# Patient Record
Sex: MALE | Race: WHITE | NOT HISPANIC OR LATINO | Employment: OTHER | URBAN - METROPOLITAN AREA
[De-identification: names, ages, dates, MRNs, and addresses within clinical notes are randomized per-mention and may not be internally consistent; named-entity substitution may affect disease eponyms.]

---

## 2017-03-20 ENCOUNTER — OFFICE VISIT - HEALTHEAST (OUTPATIENT)
Dept: FAMILY MEDICINE | Facility: CLINIC | Age: 67
End: 2017-03-20

## 2017-03-20 DIAGNOSIS — E78.00 PURE HYPERCHOLESTEROLEMIA: ICD-10-CM

## 2017-03-20 DIAGNOSIS — E55.9 VITAMIN D DEFICIENCY: ICD-10-CM

## 2017-03-20 DIAGNOSIS — Z00.00 HEALTHCARE MAINTENANCE: ICD-10-CM

## 2017-03-20 DIAGNOSIS — I10 ESSENTIAL HYPERTENSION: ICD-10-CM

## 2017-03-20 DIAGNOSIS — N40.0 BPH (BENIGN PROSTATIC HYPERPLASIA): ICD-10-CM

## 2017-03-20 LAB
CHOLEST SERPL-MCNC: 139 MG/DL
FASTING STATUS PATIENT QL REPORTED: YES
HDLC SERPL-MCNC: 42 MG/DL
LDLC SERPL CALC-MCNC: 76 MG/DL
TRIGL SERPL-MCNC: 107 MG/DL

## 2017-03-24 ENCOUNTER — COMMUNICATION - HEALTHEAST (OUTPATIENT)
Dept: FAMILY MEDICINE | Facility: CLINIC | Age: 67
End: 2017-03-24

## 2017-05-02 ENCOUNTER — OFFICE VISIT - HEALTHEAST (OUTPATIENT)
Dept: FAMILY MEDICINE | Facility: CLINIC | Age: 67
End: 2017-05-02

## 2017-05-02 DIAGNOSIS — J30.9 ALLERGIC SINUSITIS: ICD-10-CM

## 2018-03-07 ENCOUNTER — OFFICE VISIT - HEALTHEAST (OUTPATIENT)
Dept: FAMILY MEDICINE | Facility: CLINIC | Age: 68
End: 2018-03-07

## 2018-03-07 DIAGNOSIS — E55.9 VITAMIN D DEFICIENCY: ICD-10-CM

## 2018-03-07 DIAGNOSIS — L81.9 ATYPICAL PIGMENTED SKIN LESION: ICD-10-CM

## 2018-03-07 DIAGNOSIS — E78.00 PURE HYPERCHOLESTEROLEMIA: ICD-10-CM

## 2018-03-07 DIAGNOSIS — I10 ESSENTIAL HYPERTENSION: ICD-10-CM

## 2018-03-07 DIAGNOSIS — N40.0 BPH (BENIGN PROSTATIC HYPERPLASIA): ICD-10-CM

## 2018-03-07 LAB
ALBUMIN SERPL-MCNC: 4 G/DL (ref 3.5–5)
ALP SERPL-CCNC: 83 U/L (ref 45–120)
ALT SERPL W P-5'-P-CCNC: 24 U/L (ref 0–45)
ANION GAP SERPL CALCULATED.3IONS-SCNC: 8 MMOL/L (ref 5–18)
AST SERPL W P-5'-P-CCNC: 22 U/L (ref 0–40)
BILIRUB SERPL-MCNC: 1.1 MG/DL (ref 0–1)
BUN SERPL-MCNC: 12 MG/DL (ref 8–22)
CALCIUM SERPL-MCNC: 9.7 MG/DL (ref 8.5–10.5)
CHLORIDE BLD-SCNC: 104 MMOL/L (ref 98–107)
CHOLEST SERPL-MCNC: 152 MG/DL
CO2 SERPL-SCNC: 26 MMOL/L (ref 22–31)
CREAT SERPL-MCNC: 1.07 MG/DL (ref 0.7–1.3)
FASTING STATUS PATIENT QL REPORTED: YES
GFR SERPL CREATININE-BSD FRML MDRD: >60 ML/MIN/1.73M2
GLUCOSE BLD-MCNC: 95 MG/DL (ref 70–125)
HDLC SERPL-MCNC: 42 MG/DL
LDLC SERPL CALC-MCNC: 87 MG/DL
POTASSIUM BLD-SCNC: 5 MMOL/L (ref 3.5–5)
PROT SERPL-MCNC: 7.2 G/DL (ref 6–8)
SODIUM SERPL-SCNC: 138 MMOL/L (ref 136–145)
TRIGL SERPL-MCNC: 113 MG/DL

## 2018-03-07 ASSESSMENT — MIFFLIN-ST. JEOR: SCORE: 1654.84

## 2018-03-08 ENCOUNTER — COMMUNICATION - HEALTHEAST (OUTPATIENT)
Dept: FAMILY MEDICINE | Facility: CLINIC | Age: 68
End: 2018-03-08

## 2018-03-08 LAB — 25(OH)D3 SERPL-MCNC: 75.8 NG/ML (ref 30–80)

## 2018-03-09 LAB
LAB AP CHARGES (HE HISTORICAL CONVERSION): NORMAL
PATH REPORT.COMMENTS IMP SPEC: NORMAL
PATH REPORT.FINAL DX SPEC: NORMAL
PATH REPORT.GROSS SPEC: NORMAL
PATH REPORT.MICROSCOPIC SPEC OTHER STN: NORMAL
PATH REPORT.RELEVANT HX SPEC: NORMAL
RESULT FLAG (HE HISTORICAL CONVERSION): NORMAL

## 2018-03-10 ENCOUNTER — COMMUNICATION - HEALTHEAST (OUTPATIENT)
Dept: FAMILY MEDICINE | Facility: CLINIC | Age: 68
End: 2018-03-10

## 2018-03-10 DIAGNOSIS — N40.0 BPH (BENIGN PROSTATIC HYPERPLASIA): ICD-10-CM

## 2018-03-10 DIAGNOSIS — E78.00 PURE HYPERCHOLESTEROLEMIA: ICD-10-CM

## 2018-03-14 ENCOUNTER — OFFICE VISIT - HEALTHEAST (OUTPATIENT)
Dept: FAMILY MEDICINE | Facility: CLINIC | Age: 68
End: 2018-03-14

## 2018-03-14 DIAGNOSIS — I10 ESSENTIAL HYPERTENSION: ICD-10-CM

## 2018-03-14 DIAGNOSIS — Z48.02 VISIT FOR SUTURE REMOVAL: ICD-10-CM

## 2018-03-14 ASSESSMENT — MIFFLIN-ST. JEOR: SCORE: 1654.84

## 2018-07-25 ENCOUNTER — RECORDS - HEALTHEAST (OUTPATIENT)
Dept: ADMINISTRATIVE | Facility: OTHER | Age: 68
End: 2018-07-25

## 2018-07-30 ENCOUNTER — OFFICE VISIT - HEALTHEAST (OUTPATIENT)
Dept: FAMILY MEDICINE | Facility: CLINIC | Age: 68
End: 2018-07-30

## 2018-07-30 ENCOUNTER — COMMUNICATION - HEALTHEAST (OUTPATIENT)
Dept: SCHEDULING | Facility: CLINIC | Age: 68
End: 2018-07-30

## 2018-07-30 DIAGNOSIS — S30.860A TICK BITE OF BACK, INITIAL ENCOUNTER: ICD-10-CM
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
## 2018-12-20 ENCOUNTER — COMMUNICATION - HEALTHEAST (OUTPATIENT)
Dept: FAMILY MEDICINE | Facility: CLINIC | Age: 68
End: 2018-12-20

## 2018-12-20 DIAGNOSIS — E78.00 PURE HYPERCHOLESTEROLEMIA: ICD-10-CM

## 2019-03-21 ENCOUNTER — COMMUNICATION - HEALTHEAST (OUTPATIENT)
Dept: FAMILY MEDICINE | Facility: CLINIC | Age: 69
End: 2019-03-21

## 2019-03-21 DIAGNOSIS — I10 ESSENTIAL HYPERTENSION: ICD-10-CM

## 2019-04-01 ENCOUNTER — COMMUNICATION - HEALTHEAST (OUTPATIENT)
Dept: FAMILY MEDICINE | Facility: CLINIC | Age: 69
End: 2019-04-01

## 2019-04-01 DIAGNOSIS — N40.0 BPH (BENIGN PROSTATIC HYPERPLASIA): ICD-10-CM

## 2019-06-10 ENCOUNTER — COMMUNICATION - HEALTHEAST (OUTPATIENT)
Dept: FAMILY MEDICINE | Facility: CLINIC | Age: 69
End: 2019-06-10

## 2019-06-10 ENCOUNTER — OFFICE VISIT - HEALTHEAST (OUTPATIENT)
Dept: FAMILY MEDICINE | Facility: CLINIC | Age: 69
End: 2019-06-10

## 2019-06-10 DIAGNOSIS — Z11.59 ENCOUNTER FOR HEPATITIS C SCREENING TEST FOR LOW RISK PATIENT: ICD-10-CM

## 2019-06-10 DIAGNOSIS — I10 ESSENTIAL HYPERTENSION: ICD-10-CM

## 2019-06-10 DIAGNOSIS — E78.00 PURE HYPERCHOLESTEROLEMIA: ICD-10-CM

## 2019-06-10 DIAGNOSIS — Z00.00 HEALTH CARE MAINTENANCE: ICD-10-CM

## 2019-06-10 LAB
ALBUMIN SERPL-MCNC: 4.1 G/DL (ref 3.5–5)
ALP SERPL-CCNC: 82 U/L (ref 45–120)
ALT SERPL W P-5'-P-CCNC: 24 U/L (ref 0–45)
ANION GAP SERPL CALCULATED.3IONS-SCNC: 10 MMOL/L (ref 5–18)
AST SERPL W P-5'-P-CCNC: 24 U/L (ref 0–40)
BILIRUB SERPL-MCNC: 0.9 MG/DL (ref 0–1)
BUN SERPL-MCNC: 13 MG/DL (ref 8–22)
CALCIUM SERPL-MCNC: 9.7 MG/DL (ref 8.5–10.5)
CHLORIDE BLD-SCNC: 105 MMOL/L (ref 98–107)
CHOLEST SERPL-MCNC: 136 MG/DL
CO2 SERPL-SCNC: 25 MMOL/L (ref 22–31)
CREAT SERPL-MCNC: 0.96 MG/DL (ref 0.7–1.3)
FASTING STATUS PATIENT QL REPORTED: YES
GFR SERPL CREATININE-BSD FRML MDRD: >60 ML/MIN/1.73M2
GLUCOSE BLD-MCNC: 90 MG/DL (ref 70–125)
HCV AB SERPL QL IA: NEGATIVE
HDLC SERPL-MCNC: 47 MG/DL
LDLC SERPL CALC-MCNC: 72 MG/DL
POTASSIUM BLD-SCNC: 4.9 MMOL/L (ref 3.5–5)
PROT SERPL-MCNC: 7 G/DL (ref 6–8)
SODIUM SERPL-SCNC: 140 MMOL/L (ref 136–145)
TRIGL SERPL-MCNC: 86 MG/DL

## 2019-06-10 ASSESSMENT — MIFFLIN-ST. JEOR: SCORE: 1620.82

## 2019-06-21 ENCOUNTER — COMMUNICATION - HEALTHEAST (OUTPATIENT)
Dept: FAMILY MEDICINE | Facility: CLINIC | Age: 69
End: 2019-06-21

## 2019-06-21 DIAGNOSIS — E78.00 PURE HYPERCHOLESTEROLEMIA: ICD-10-CM

## 2019-07-02 ENCOUNTER — COMMUNICATION - HEALTHEAST (OUTPATIENT)
Dept: FAMILY MEDICINE | Facility: CLINIC | Age: 69
End: 2019-07-02

## 2019-07-02 DIAGNOSIS — I10 ESSENTIAL HYPERTENSION: ICD-10-CM

## 2019-07-08 ENCOUNTER — OFFICE VISIT - HEALTHEAST (OUTPATIENT)
Dept: FAMILY MEDICINE | Facility: CLINIC | Age: 69
End: 2019-07-08

## 2019-07-08 DIAGNOSIS — I10 ESSENTIAL HYPERTENSION: ICD-10-CM

## 2019-07-08 DIAGNOSIS — R00.0 TACHYCARDIA: ICD-10-CM

## 2019-07-08 DIAGNOSIS — R94.31 ABNORMAL ELECTROCARDIOGRAM: ICD-10-CM

## 2019-07-08 LAB
ATRIAL RATE - MUSE: 113 BPM
DIASTOLIC BLOOD PRESSURE - MUSE: NORMAL MMHG
INTERPRETATION ECG - MUSE: NORMAL
P AXIS - MUSE: 66 DEGREES
PR INTERVAL - MUSE: 152 MS
QRS DURATION - MUSE: 70 MS
QT - MUSE: 318 MS
QTC - MUSE: 436 MS
R AXIS - MUSE: 71 DEGREES
SYSTOLIC BLOOD PRESSURE - MUSE: NORMAL MMHG
T AXIS - MUSE: 47 DEGREES
VENTRICULAR RATE- MUSE: 113 BPM

## 2019-07-08 ASSESSMENT — MIFFLIN-ST. JEOR: SCORE: 1589.07

## 2019-07-30 ENCOUNTER — HOSPITAL ENCOUNTER (OUTPATIENT)
Dept: NUCLEAR MEDICINE | Facility: HOSPITAL | Age: 69
Discharge: HOME | End: 2019-07-30
Attending: FAMILY MEDICINE

## 2019-07-30 ENCOUNTER — COMMUNICATION - HEALTHEAST (OUTPATIENT)
Dept: FAMILY MEDICINE | Facility: CLINIC | Age: 69
End: 2019-07-30

## 2019-07-30 ENCOUNTER — HOSPITAL ENCOUNTER (OUTPATIENT)
Dept: CARDIOLOGY | Facility: HOSPITAL | Age: 69
Discharge: HOME | End: 2019-07-30
Attending: FAMILY MEDICINE

## 2019-07-30 DIAGNOSIS — R94.31 ABNORMAL ELECTROCARDIOGRAM: ICD-10-CM

## 2019-07-30 DIAGNOSIS — R00.0 TACHYCARDIA: ICD-10-CM

## 2019-07-30 DIAGNOSIS — Z82.49 FAMILY HISTORY OF ARRHYTHMIA: ICD-10-CM

## 2019-07-30 LAB
CV STRESS CURRENT BP HE: NORMAL
CV STRESS CURRENT HR HE: 107
CV STRESS CURRENT HR HE: 108
CV STRESS CURRENT HR HE: 110
CV STRESS CURRENT HR HE: 111
CV STRESS CURRENT HR HE: 112
CV STRESS CURRENT HR HE: 113
CV STRESS CURRENT HR HE: 114
CV STRESS CURRENT HR HE: 115
CV STRESS CURRENT HR HE: 121
CV STRESS CURRENT HR HE: 130
CV STRESS CURRENT HR HE: 132
CV STRESS CURRENT HR HE: 133
CV STRESS CURRENT HR HE: 134
CV STRESS CURRENT HR HE: 135
CV STRESS CURRENT HR HE: 143
CV STRESS CURRENT HR HE: 144
CV STRESS CURRENT HR HE: 153
CV STRESS CURRENT HR HE: 162
CV STRESS CURRENT HR HE: 163
CV STRESS CURRENT HR HE: 166
CV STRESS CURRENT HR HE: 167
CV STRESS CURRENT HR HE: 168
CV STRESS CURRENT HR HE: 89
CV STRESS DEVIATION TIME HE: NORMAL
CV STRESS ECHO PERCENT HR HE: NORMAL
CV STRESS EXERCISE STAGE HE: NORMAL
CV STRESS EXERCISE STAGE REACHED HE: NORMAL
CV STRESS FINAL RESTING BP HE: NORMAL
CV STRESS FINAL RESTING HR HE: 111
CV STRESS MAX HR HE: 168
CV STRESS MAX TREADMILL GRADE HE: 14
CV STRESS MAX TREADMILL SPEED HE: 3.4
CV STRESS PEAK DIA BP HE: NORMAL
CV STRESS PEAK SYS BP HE: NORMAL
CV STRESS PHASE HE: NORMAL
CV STRESS PROTOCOL HE: NORMAL
CV STRESS RESTING PT POSITION HE: NORMAL
CV STRESS RESTING PT POSITION HE: NORMAL
CV STRESS ST DEVIATION AMOUNT HE: NORMAL
CV STRESS ST DEVIATION ELEVATION HE: NORMAL
CV STRESS ST EVELATION AMOUNT HE: NORMAL
CV STRESS TEST TYPE HE: NORMAL
CV STRESS TOTAL STAGE TIME MIN 1 HE: NORMAL
NUC STRESS EJECTION FRACTION: 65 %
STRESS ECHO BASELINE BP: NORMAL
STRESS ECHO BASELINE HR: 87
STRESS ECHO CALCULATED PERCENT HR: 111 %
STRESS ECHO LAST STRESS BP: NORMAL
STRESS ECHO LAST STRESS HR: 167
STRESS ECHO POST ESTIMATED WORKLOAD: 7.9
STRESS ECHO POST EXERCISE DUR MIN: 6
STRESS ECHO POST EXERCISE DUR SEC: 30
STRESS ECHO TARGET HR: 128

## 2019-07-30 ASSESSMENT — MIFFLIN-ST. JEOR: SCORE: 1589.07

## 2019-08-02 ENCOUNTER — HOSPITAL ENCOUNTER (OUTPATIENT)
Dept: CARDIOLOGY | Facility: HOSPITAL | Age: 69
Discharge: HOME | End: 2019-08-02
Attending: FAMILY MEDICINE

## 2019-08-02 DIAGNOSIS — R00.0 TACHYCARDIA: ICD-10-CM

## 2019-08-02 DIAGNOSIS — Z82.49 FAMILY HISTORY OF ARRHYTHMIA: ICD-10-CM

## 2020-04-02 ENCOUNTER — COMMUNICATION - HEALTHEAST (OUTPATIENT)
Dept: FAMILY MEDICINE | Facility: CLINIC | Age: 70
End: 2020-04-02

## 2020-04-02 DIAGNOSIS — N40.0 BPH (BENIGN PROSTATIC HYPERPLASIA): ICD-10-CM

## 2020-06-24 ENCOUNTER — COMMUNICATION - HEALTHEAST (OUTPATIENT)
Dept: FAMILY MEDICINE | Facility: CLINIC | Age: 70
End: 2020-06-24

## 2020-06-24 DIAGNOSIS — I10 ESSENTIAL HYPERTENSION: ICD-10-CM

## 2020-06-25 ENCOUNTER — COMMUNICATION - HEALTHEAST (OUTPATIENT)
Dept: FAMILY MEDICINE | Facility: CLINIC | Age: 70
End: 2020-06-25

## 2020-06-25 DIAGNOSIS — I10 ESSENTIAL HYPERTENSION: ICD-10-CM

## 2020-07-03 ENCOUNTER — COMMUNICATION - HEALTHEAST (OUTPATIENT)
Dept: LAB | Facility: CLINIC | Age: 70
End: 2020-07-03

## 2020-07-06 ENCOUNTER — AMBULATORY - HEALTHEAST (OUTPATIENT)
Dept: FAMILY MEDICINE | Facility: CLINIC | Age: 70
End: 2020-07-06

## 2020-07-06 DIAGNOSIS — I10 ESSENTIAL HYPERTENSION: ICD-10-CM

## 2020-07-06 DIAGNOSIS — Z00.00 HEALTHCARE MAINTENANCE: ICD-10-CM

## 2020-07-08 ENCOUNTER — AMBULATORY - HEALTHEAST (OUTPATIENT)
Dept: LAB | Facility: CLINIC | Age: 70
End: 2020-07-08

## 2020-07-08 DIAGNOSIS — Z00.00 HEALTHCARE MAINTENANCE: ICD-10-CM

## 2020-07-08 DIAGNOSIS — I10 ESSENTIAL HYPERTENSION: ICD-10-CM

## 2020-07-08 LAB
ALBUMIN SERPL-MCNC: 4.1 G/DL (ref 3.5–5)
ALP SERPL-CCNC: 84 U/L (ref 45–120)
ALT SERPL W P-5'-P-CCNC: 28 U/L (ref 0–45)
ANION GAP SERPL CALCULATED.3IONS-SCNC: 11 MMOL/L (ref 5–18)
AST SERPL W P-5'-P-CCNC: 21 U/L (ref 0–40)
BILIRUB SERPL-MCNC: 1 MG/DL (ref 0–1)
BUN SERPL-MCNC: 20 MG/DL (ref 8–28)
CALCIUM SERPL-MCNC: 9.8 MG/DL (ref 8.5–10.5)
CHLORIDE BLD-SCNC: 100 MMOL/L (ref 98–107)
CHOLEST SERPL-MCNC: 147 MG/DL
CO2 SERPL-SCNC: 27 MMOL/L (ref 22–31)
CREAT SERPL-MCNC: 1.05 MG/DL (ref 0.7–1.3)
FASTING STATUS PATIENT QL REPORTED: YES
GFR SERPL CREATININE-BSD FRML MDRD: >60 ML/MIN/1.73M2
GLUCOSE BLD-MCNC: 102 MG/DL (ref 70–125)
HDLC SERPL-MCNC: 44 MG/DL
LDLC SERPL CALC-MCNC: 79 MG/DL
POTASSIUM BLD-SCNC: 3.4 MMOL/L (ref 3.5–5)
PROT SERPL-MCNC: 7.3 G/DL (ref 6–8)
SODIUM SERPL-SCNC: 138 MMOL/L (ref 136–145)
TRIGL SERPL-MCNC: 122 MG/DL

## 2020-07-09 ENCOUNTER — COMMUNICATION - HEALTHEAST (OUTPATIENT)
Dept: FAMILY MEDICINE | Facility: CLINIC | Age: 70
End: 2020-07-09

## 2020-07-21 ENCOUNTER — COMMUNICATION - HEALTHEAST (OUTPATIENT)
Dept: FAMILY MEDICINE | Facility: CLINIC | Age: 70
End: 2020-07-21

## 2020-07-21 ENCOUNTER — OFFICE VISIT - HEALTHEAST (OUTPATIENT)
Dept: FAMILY MEDICINE | Facility: CLINIC | Age: 70
End: 2020-07-21

## 2020-07-21 DIAGNOSIS — Z00.00 HEALTH CARE MAINTENANCE: ICD-10-CM

## 2020-07-21 DIAGNOSIS — E78.00 PURE HYPERCHOLESTEROLEMIA: ICD-10-CM

## 2020-07-21 DIAGNOSIS — M19.031 LOCALIZED PRIMARY OSTEOARTHROSIS OF CARPOMETACARPAL JOINT OF RIGHT WRIST: ICD-10-CM

## 2020-07-21 DIAGNOSIS — N40.0 BPH (BENIGN PROSTATIC HYPERPLASIA): ICD-10-CM

## 2020-07-21 DIAGNOSIS — I10 ESSENTIAL HYPERTENSION: ICD-10-CM

## 2020-07-21 RX ORDER — ATORVASTATIN CALCIUM 40 MG/1
40 TABLET, FILM COATED ORAL DAILY
Qty: 90 TABLET | Refills: 3 | Status: SHIPPED | OUTPATIENT
Start: 2020-07-21 | End: 2021-07-22

## 2020-07-21 RX ORDER — TERAZOSIN 5 MG/1
5 CAPSULE ORAL AT BEDTIME
Qty: 90 CAPSULE | Refills: 3 | Status: SHIPPED | OUTPATIENT
Start: 2020-07-21 | End: 2021-10-28

## 2020-07-21 RX ORDER — CHLORTHALIDONE 25 MG/1
25 TABLET ORAL DAILY
Qty: 90 TABLET | Refills: 3 | Status: SHIPPED | OUTPATIENT
Start: 2020-07-21 | End: 2021-07-22

## 2020-07-21 ASSESSMENT — MIFFLIN-ST. JEOR: SCORE: 1612.88

## 2021-05-26 NOTE — TELEPHONE ENCOUNTER
Refill Approved    Rx renewed per Medication Renewal Policy. Medication was last renewed on 3/7/18.    Nidhi Ruggiero, [facility] Triage/Med Refill 3/23/2019     Requested Prescriptions   Pending Prescriptions Disp Refills     verapamil (VERELAN PM) 240 MG 24 hr capsule [Pharmacy Med Name: VERAPAMIL  MG CAPSULE] 90 capsule 3     Sig: TAKE 1 CAP BY MOUTH AT BEDTIME    Calcium-Channel Blockers Protocol Passed - 3/21/2019  4:19 PM       Passed - PCP or prescribing provider visit in past 12 months or next 3 months    Last office visit with prescriber/PCP: 3/14/2018 Chiquis Lopez MD OR same dept: 7/30/2018 Kathy Campuzano MD OR same specialty: 7/30/2018 Kathy Campuzano MD  Last physical: Visit date not found Last MTM visit: Visit date not found   Next visit within 3 mo: Visit date not found  Next physical within 3 mo: Visit date not found  Prescriber OR PCP: Chiquis Lopez MD  Last diagnosis associated with med order: 1. Essential hypertension  - verapamil (VERELAN PM) 240 MG 24 hr capsule [Pharmacy Med Name: VERAPAMIL  MG CAPSULE]; TAKE 1 CAP BY MOUTH AT BEDTIME  Dispense: 90 capsule; Refill: 3    If protocol passes may refill for 12 months if within 3 months of last provider visit (or a total of 15 months).            Passed - Blood pressure filed in past 12 months    BP Readings from Last 1 Encounters:   07/30/18 120/80

## 2021-05-27 NOTE — TELEPHONE ENCOUNTER
Former patient of Krystle & has not established care with another provider.  Please assign refill request to covering provider per Clinic standard process.      Refill Approved    Rx renewed per Medication Renewal Policy. Medication was last renewed on 2/22/16.    Heena Pang, [facility] Triage/Med Refill 4/2/2019     Requested Prescriptions   Pending Prescriptions Disp Refills     terazosin (HYTRIN) 5 MG capsule [Pharmacy Med Name: TERAZOSIN 5 MG CAPSULE] 90 capsule 3     Sig: TAKE 1 CAPSULE (5 MG TOTAL) BY MOUTH AT BEDTIME.    Alpha Blockers Refill Protocol  Passed - 4/1/2019  1:31 PM       Passed - PCP or prescribing provider visit in past 12 months      Last office visit with prescriber/PCP: 3/14/2018 Chiquis Lopez MD OR same dept: 7/30/2018 Kathy Campuzano MD OR same specialty: 7/30/2018 Kathy Campuzano MD  Last physical: Visit date not found Last MTM visit: Visit date not found   Next visit within 3 mo: Visit date not found  Next physical within 3 mo: Visit date not found  Prescriber OR PCP: Chiquis Lopez MD  Last diagnosis associated with med order: 1. BPH (benign prostatic hyperplasia)  - terazosin (HYTRIN) 5 MG capsule [Pharmacy Med Name: TERAZOSIN 5 MG CAPSULE]; Take 1 capsule (5 mg total) by mouth at bedtime.  Dispense: 90 capsule; Refill: 3    If protocol passes may refill for 12 months if within 3 months of last provider visit (or a total of 15 months).            Passed - Blood pressure filed in past 12 months    BP Readings from Last 1 Encounters:   07/30/18 120/80

## 2021-05-29 ENCOUNTER — RECORDS - HEALTHEAST (OUTPATIENT)
Dept: ADMINISTRATIVE | Facility: CLINIC | Age: 71
End: 2021-05-29

## 2021-05-29 NOTE — PROGRESS NOTES
Assessment and Plan:     Presents to clinic for annual wellness visit.  His blood pressure was slightly elevated and his verapamil is significantly more expensive than it has been in the past.  We chose to switch to chlorthalidone for its cardioprotective benefits and likely cheaper price point.  Patient will monitor blood pressure for the next 2 weeks and return for evaluation of efficacy.    Handout was provided detailing some shoulder exercises patient could do to limit some of the stiffness he feels after exercising.      USPSTF Recommendations for age 69;  Patient has been counseled on/screened for:  - intimate partner violence and there are no concerns at this time  - a healthful diet and physical activity for CVD disease prevention  - Diabetes and hyperlipidemia: screening was performed.   - Hep C, ordered today  - Asprin use: patient chose to continue for this year  Sexually transmitted infections: Patient would not like to be screened for chlamydia, gonorrhea, syphilis, HIV, and hepatitis  Colorectal Cancer: Colonoscopy last performed 2018  Immunizations: up to date except for shingles which was recommended  Fall risk assessment: get up and go test completed without concerns      The patient's current medical problems were reviewed.    I have had an Advance Directives discussion with the patient.     The following health maintenance schedule was reviewed with the patient and provided in printed form in the after visit summary:   Health Maintenance   Topic Date Due     ZOSTER VACCINES (2 of 3) 10/08/2012     INFLUENZA VACCINE RULE BASED (Season Ended) 08/01/2019     FALL RISK ASSESSMENT  06/10/2020     TD 18+ HE  08/16/2021     ADVANCE DIRECTIVES DISCUSSED WITH PATIENT  06/10/2024     COLONOSCOPY  07/25/2028     PNEUMOCOCCAL POLYSACCHARIDE VACCINE AGE 65 AND OVER  Completed     PNEUMOCOCCAL CONJUGATE VACCINE FOR ADULTS (PCV13 OR PREVNAR)  Completed        Subjective:   Chief Complaint: Eduardo Collins is  an 69 y.o. male here for an Annual Wellness visit.     HPI:    - patient monitors blood pressure at home and intermittently outside the home: typically 125/80 is the average; he's a little surprised that it's slightly elevated today  - questions about aspirin  -he wonders if he should have the new shingles shot  - he also has concerns about verapamil. He has been on it for 30 years. He is wondering if there is another option that might also work that might be cheaper.   -Patient has some shoulder stiffness after exercising.    Review of Systems:  Allergy symptoms, weight changes, leg cramps, joint pain, arthritis, back pain, joint swelling, and sexual dysfunction.  Please see above.  The rest of the review of systems are negative for all systems.    Patient Care Team:  Chiquis Lopez MD as PCP - General (Family Medicine)     Patient Active Problem List   Diagnosis     Hypercholesterolemia     Hypertension     Localized primary osteoarthrosis of carpometacarpal joint of right wrist     Past Medical History:   Diagnosis Date     Arthritis      Depression     Controlled per patient, on medication     Hayfever     Seasonal per patient     Hyperlipemia     per patient      Hypertension      Prostatitis     History of in past per patient      Past Surgical History:   Procedure Laterality Date     APPENDECTOMY  1995     TOE SURGERY Bilateral     Had bilateral great toe surgery in past      Family History   Problem Relation Age of Onset     Cancer Mother         skin and multiple myeloma     Stroke Father      Heart disease Brother      Hypertension Brother       Social History     Socioeconomic History     Marital status:      Spouse name: Not on file     Number of children: Not on file     Years of education: Not on file     Highest education level: Not on file   Occupational History     Occupation:      Employer: [employer]   Social Needs     Financial resource strain: Not on file     Food  "insecurity:     Worry: Not on file     Inability: Not on file     Transportation needs:     Medical: Not on file     Non-medical: Not on file   Tobacco Use     Smoking status: Never Smoker     Smokeless tobacco: Never Used   Substance and Sexual Activity     Alcohol use: Yes     Alcohol/week: 3.6 oz     Types: 6 Cans of beer per week     Comment: Usually has a couple of beers 2-3 x per week.     Drug use: No     Sexual activity: Yes     Partners: Female   Lifestyle     Physical activity:     Days per week: Not on file     Minutes per session: Not on file     Stress: Not on file   Relationships     Social connections:     Talks on phone: Not on file     Gets together: Not on file     Attends Hindu service: Not on file     Active member of club or organization: Not on file     Attends meetings of clubs or organizations: Not on file     Relationship status: Not on file     Intimate partner violence:     Fear of current or ex partner: Not on file     Emotionally abused: Not on file     Physically abused: Not on file     Forced sexual activity: Not on file   Other Topics Concern     Not on file   Social History Narrative     Not on file      Current Outpatient Medications   Medication Sig Dispense Refill     aspirin 81 MG tablet Take 81 mg by mouth bedtime.        atorvastatin (LIPITOR) 10 MG tablet Take 1 tablet (10 mg total) by mouth at bedtime. 90 tablet 1     cholecalciferol, vitamin D3, 1,000 unit tablet Take 5,000 Units by mouth bedtime.        terazosin (HYTRIN) 5 MG capsule TAKE 1 CAPSULE (5 MG TOTAL) BY MOUTH AT BEDTIME. 90 capsule 3     chlorthalidone (HYGROTEN) 25 MG tablet Take 1 tablet (25 mg total) by mouth daily. 30 tablet 0     No current facility-administered medications for this visit.       Objective:   Vital Signs:   Visit Vitals  BP (!) 134/98   Pulse 84   Ht 5' 9\" (1.753 m)   Wt 193 lb (87.5 kg)   SpO2 96%   BMI 28.50 kg/m         VisionScreening:  No exam data present     PHYSICAL EXAM  Gen: " Alert, NAD, appears stated age, normal hygiene   Eyes: conjunctivae without injection, sclera clear, EOMI  ENT/mouth: nares clear, septum midline, absent rhinorrhea, throat without injection, neck is supple, no thyroid enlargement  CV: RRR, no murmur appreciated, pedal edema absent bilaterally  Resp: CTAB, no wheezes, rales or ronchi  ABD: normoactive, non-tender to palpation, nondistended  MSK: grossly full range of motion in all joints, no obvious deformity although there is a slight decrease in flexion of the right index finger  Neuro: CN II-XII grossly intact, no deficits in coordination  Psych: no apparent hallucinations or delusions, no pressured speech; alert, oriented x3  SKIN: dry and without lesions except a known and unchanging birthmark appreciated on patient's right upper extremity  Heme/lymph: no pallor, no active bleeding/bruising, bilateral submandibular adenopathy appreciated      Assessment Results 6/10/2019   Activities of Daily Living No help needed   Instrumental Activities of Daily Living No help needed   Get Up and Go Score Less than 12 seconds   Mini Cog Total Score 5   Some recent data might be hidden     A Mini-Cog score of 0-2 suggests the possibility of dementia, score of 3-5 suggests no dementia    Identified Health Risks:     The patient was counseled and encouraged to consider modifying their diet and eating habits. He was provided with information on recommended healthy diet options.  The patient was provided with written information regarding signs of hearing loss.  He is at risk for falling and has been provided with information to reduce the risk of falling at home.  Patient's advanced directive was discussed and I am comfortable with the patient's wishes.

## 2021-05-29 NOTE — TELEPHONE ENCOUNTER
6/10/2019 dosage changed for Atorvastatin from 10mg daily to 40 mg daily. Message sent to pharmacy.    Allison Saldivar RN [facility] Triage Nurse

## 2021-05-30 VITALS — WEIGHT: 194 LBS | BODY MASS INDEX: 27.84 KG/M2

## 2021-05-30 VITALS — WEIGHT: 197.1 LBS | BODY MASS INDEX: 28.28 KG/M2

## 2021-05-30 NOTE — PROGRESS NOTES
Just to clarify, you believe the abnormal component is just artifactual - no further follow up necessary?

## 2021-05-30 NOTE — PROGRESS NOTES
Assessment/Plan:     Patient returns to clinic for hypertension follow-up.  His blood pressure originally had an elevated diastolic value, this resolved with recheck.  I do not feel that increasing the chlorthalidone right now is necessary, and patient is comfortable to current dosing.    However, patient's pulse was noted to be markedly elevated at 122, on repeat evaluation was 115.  Patient denies shortness of breath, dizziness, chest pressure or pain, paresthesias, etc.  Although his heart rhythm was regular, and he endorses no history of arrhythmias, we felt that an EKG was appropriate.  This demonstrated just a sinus tachycardia, although my interpretation included some concern about a baseline without significant ST depression.  We will await cardiology read, and order a stress test for baseline for the patient.  I do not feel that he needs emergent work-up as he is currently asymptomatic.     Problem List Items Addressed This Visit        ENT/CARD/PULM/ENDO Problems    Hypertension (Chronic)      Other Visit Diagnoses     Tachycardia    -  Primary    Relevant Orders    Electrocardiogram Perform and Read (Completed)          Return to clinic in 3 weeks; if stress test is normal, will consider labs for tachycardia.    Total time spent with patient was 25 minutes with greater than 50% spent in face-to-face counseling regarding the above plan.    Subjective:      Eduardo Collins is a 69 y.o. male who presents to clinic for med check.   Patient has a history of hypertension, at his physical he endorsed that verapamil is significantly more expensive than he would like.  We switch to chlorthalidone and patient was to monitor his blood pressure for 2 weeks.    Patient is noticing no side effects from the chlorthalidone, he is noticing no hyper or hypotensive episodes.  He would like all his medications refilled.  He wished to discuss why we had gone from 10 to 40 mg of atorvastatin at the last appointment.  He  "does note that occasionally his pulse is elevated, it is usually 80 at its lowest, when he is resting.  He occasionally notices his pulse is significantly elevated, the highest we have seen in clinic is 109.  Today, his pulse is 122.    He does note that his brother  yesterday, and that that could be part of patient's current elevated diastolic blood pressure and pulse.    Past Medical History, Family History, and Social History reviewed.     Current Outpatient Medications on File Prior to Visit   Medication Sig Dispense Refill     aspirin 81 MG tablet Take 81 mg by mouth bedtime.        atorvastatin (LIPITOR) 40 MG tablet Take 1 tablet (40 mg total) by mouth daily. 90 tablet 3     chlorthalidone (HYGROTEN) 25 MG tablet TAKE 1 TABLET BY MOUTH EVERY DAY 90 tablet 3     cholecalciferol, vitamin D3, 1,000 unit tablet Take 5,000 Units by mouth bedtime.        terazosin (HYTRIN) 5 MG capsule TAKE 1 CAPSULE (5 MG TOTAL) BY MOUTH AT BEDTIME. 90 capsule 3     No current facility-administered medications on file prior to visit.        Review of systems is as stated in HPI.  The remainder of the 10 system review is otherwise negative.    Objective:     /80   Pulse (!) 115   Ht 5' 9\" (1.753 m)   Wt 186 lb (84.4 kg)   SpO2 97%   BMI 27.47 kg/m   Body mass index is 27.47 kg/m .    Gen: Alert, NAD, appears stated age, normal hygiene   CV: tachycardic but regular rhythm, no murmur appreciated, pedal edema absent bilaterally  Psych: no apparent hallucinations or delusions, no pressured speech; alert, oriented x3  SKIN: dry and without lesions      This note has been dictated using voice recognition software. Any grammatical or context distortions are unintentional and inherent to the the software.     Chiquis Lopez MD  "

## 2021-05-30 NOTE — TELEPHONE ENCOUNTER
Refill Approved    Rx renewed per Medication Renewal Policy. Medication was last renewed on 6/10/19.    Heena Pang, [facility] Triage/Med Refill 7/3/2019     Requested Prescriptions   Pending Prescriptions Disp Refills     chlorthalidone (HYGROTEN) 25 MG tablet [Pharmacy Med Name: CHLORTHALIDONE 25 MG TABLET] 30 tablet 0     Sig: TAKE 1 TABLET BY MOUTH EVERY DAY       Diuretics/Combination Diuretics Refill Protocol  Passed - 7/2/2019  1:31 PM        Passed - Visit with PCP or prescribing provider visit in past 12 months     Last office visit with prescriber/PCP: 3/14/2018 Chiquis Lopez MD OR same dept: 7/30/2018 Kathy Campuzano MD OR same specialty: 7/30/2018 Kathy Campuzano MD  Last physical: 6/10/2019 Last MTM visit: Visit date not found   Next visit within 3 mo: Visit date not found  Next physical within 3 mo: Visit date not found  Prescriber OR PCP: Chiquis Lopez MD  Last diagnosis associated with med order: 1. Essential hypertension  - chlorthalidone (HYGROTEN) 25 MG tablet [Pharmacy Med Name: CHLORTHALIDONE 25 MG TABLET]; TAKE 1 TABLET BY MOUTH EVERY DAY  Dispense: 30 tablet; Refill: 0    If protocol passes may refill for 12 months if within 3 months of last provider visit (or a total of 15 months).             Passed - Serum Potassium in past 12 months      Lab Results   Component Value Date    Potassium 4.9 06/10/2019             Passed - Serum Sodium in past 12 months      Lab Results   Component Value Date    Sodium 140 06/10/2019             Passed - Blood pressure on file in past 12 months     BP Readings from Last 1 Encounters:   06/10/19 (!) 134/98             Passed - Serum Creatinine in past 12 months      Creatinine   Date Value Ref Range Status   06/10/2019 0.96 0.70 - 1.30 mg/dL Final

## 2021-05-30 NOTE — TELEPHONE ENCOUNTER
Patient to discuss the results of the stress test.  It is negative for inducible myocardial ischemia, but area that was not transmural of possible infarction but with the retained wall motion, cardiology believed it more likely to be an artifact.  If symptoms persisted they recommended that he have a coronary CTA.  However, patient does not have symptoms consistent with angina.  Rather, we were obtaining the echo to evaluate his tachycardia.  Patient endorses having a family history of both a brother and a sister with arrhythmias necessitating ablations.  I feel strongly that a Holter monitor is warranted and patient is amenable.

## 2021-06-01 VITALS — BODY MASS INDEX: 28.2 KG/M2 | HEIGHT: 70 IN | WEIGHT: 197 LBS

## 2021-06-01 VITALS — BODY MASS INDEX: 27.86 KG/M2 | WEIGHT: 194.19 LBS

## 2021-06-03 VITALS — WEIGHT: 193 LBS | BODY MASS INDEX: 28.58 KG/M2 | HEIGHT: 69 IN

## 2021-06-03 VITALS — WEIGHT: 179 LBS | HEIGHT: 71 IN | BODY MASS INDEX: 25.06 KG/M2

## 2021-06-03 VITALS — BODY MASS INDEX: 27.55 KG/M2 | WEIGHT: 186 LBS | HEIGHT: 69 IN

## 2021-06-04 VITALS
OXYGEN SATURATION: 97 % | WEIGHT: 193 LBS | BODY MASS INDEX: 28.58 KG/M2 | HEART RATE: 98 BPM | DIASTOLIC BLOOD PRESSURE: 88 MMHG | HEIGHT: 69 IN | SYSTOLIC BLOOD PRESSURE: 126 MMHG

## 2021-06-09 NOTE — TELEPHONE ENCOUNTER
Dr. Lopez, your patient Jose has a lab appointment on 7/8/20 at 9:30am. Please place orders as there currently are none. Thank you.

## 2021-06-09 NOTE — TELEPHONE ENCOUNTER
Question following Office Visit  When did you see your provider: today  What is your question: I just want to make sure all 3 medications are being refilled and sent to [facility]  Okay to leave a detailed message: Yes

## 2021-06-09 NOTE — TELEPHONE ENCOUNTER
RN cannot approve Refill Request    RN can NOT refill this medication Protocol failed and NO refill given.       Heena Pang, [facility] Triage/Med Refill 6/24/2020    Requested Prescriptions   Pending Prescriptions Disp Refills     chlorthalidone (HYGROTEN) 25 MG tablet [Pharmacy Med Name: CHLORTHALIDONE 25 MG TABLET] 90 tablet 3     Sig: TAKE 1 TABLET BY MOUTH EVERY DAY       Diuretics/Combination Diuretics Refill Protocol  Failed - 6/24/2020 12:15 AM        Failed - Serum Potassium in past 12 months      No results found for: LN-POTASSIUM          Failed - Serum Sodium in past 12 months      No results found for: LN-SODIUM          Failed - Serum Creatinine in past 12 months      Creatinine   Date Value Ref Range Status   06/10/2019 0.96 0.70 - 1.30 mg/dL Final             Passed - Visit with PCP or prescribing provider visit in past 12 months     Last office visit with prescriber/PCP: 7/8/2019 Chiquis Lopez MD OR same dept: 7/8/2019 Chiquis Lopez MD OR same specialty: 7/8/2019 Chiquis Lopez MD  Last physical: 6/10/2019 Last MTM visit: Visit date not found   Next visit within 3 mo: Visit date not found  Next physical within 3 mo: Visit date not found  Prescriber OR PCP: Chiquis Lopez MD  Last diagnosis associated with med order: 1. Essential hypertension  - chlorthalidone (HYGROTEN) 25 MG tablet [Pharmacy Med Name: CHLORTHALIDONE 25 MG TABLET]; TAKE 1 TABLET BY MOUTH EVERY DAY  Dispense: 90 tablet; Refill: 3    If protocol passes may refill for 12 months if within 3 months of last provider visit (or a total of 15 months).             Passed - Blood pressure on file in past 12 months     BP Readings from Last 1 Encounters:   07/08/19 114/80

## 2021-06-09 NOTE — PROGRESS NOTES
Assessment and Plan:   Patient presents for AWV:    1. Essential hypertension  Continue chlorthalidone his blood pressures well controlled, but if hypokalemia continues to be an issue will need to switch medications.    2. Hypercholesterolemia  Continue on statin is ASCVD risk score is 18%.    3. Localized primary osteoarthrosis of carpometacarpal joint of right wrist  - diclofenac sodium (VOLTAREN) 1 % Gel; Apply a quarter sized amount to bilateral hands and rub in thoroughly up to three times daily as needed for pain  Dispense: 1 Tube; Refill: 0    USPSTF Recommendations for age 70:  Patient has been counseled on/screened for:  - intimate partner violence and there are no concerns at this time  - a healthful diet and physical activity for CVD disease prevention  - Diabetes and hyperlipidemia: screening was performed previously   - Hep C, negative in 2019  - Asprin use: patient chose to continue  Sexually transmitted infections: Patient would not like to be screened for chlamydia, gonorrhea, syphilis, HIV, and hepatitis  Colorectal Cancer: Colonoscopy last performed 2018, due in 2023  Immunizations: up to date      The patient's current medical problems were reviewed.    I have had an Advance Directives discussion with the patient.  The following health maintenance schedule was reviewed with the patient and provided in printed form in the after visit summary:   Health Maintenance   Topic Date Due     INFLUENZA VACCINE RULE BASED (1) 08/01/2020     MEDICARE ANNUAL WELLNESS VISIT  07/21/2021     FALL RISK ASSESSMENT  07/21/2021     TD 18+ HE  08/16/2021     ADVANCE CARE PLANNING  06/10/2024     LIPID  07/08/2025     COLORECTAL CANCER SCREENING  07/25/2028     HEPATITIS C SCREENING  Completed     PNEUMOCOCCAL IMMUNIZATION 65+ LOW/MEDIUM RISK  Completed     ZOSTER VACCINES  Completed     HEPATITIS B VACCINES  Aged Out        Subjective:   Chief Complaint: Eduardo Collins is an 70 y.o. male here for an Annual  Wellness visit.   HPI:    - concerns about aspirin use  - joint stiffness    Review of Systems:  Joint pain, arthritis, allergy sx, tinnitus.  Please see above.  The rest of the review of systems are negative for all systems.    Patient Care Team:  Chiquis Lopez MD as PCP - General (Family Medicine)  Chiquis Lopez MD as Assigned PCP     Patient Active Problem List   Diagnosis     Hypercholesterolemia     Hypertension     Localized primary osteoarthrosis of carpometacarpal joint of right wrist     Past Medical History:   Diagnosis Date     Arthritis      Depression     Controlled per patient, on medication     Hayfever     Seasonal per patient     Hyperlipemia     per patient      Hypertension      Prostatitis     History of in past per patient      Past Surgical History:   Procedure Laterality Date     APPENDECTOMY  1995     TOE SURGERY Bilateral     Had bilateral great toe surgery in past      Family History   Problem Relation Age of Onset     Cancer Mother         skin and multiple myeloma     Stroke Father      Heart disease Brother      Hypertension Brother       Social History     Socioeconomic History     Marital status:      Spouse name: Not on file     Number of children: Not on file     Years of education: Not on file     Highest education level: Not on file   Occupational History     Occupation:      Employer: [employer]   Social Needs     Financial resource strain: Not on file     Food insecurity     Worry: Not on file     Inability: Not on file     Transportation needs     Medical: Not on file     Non-medical: Not on file   Tobacco Use     Smoking status: Never Smoker     Smokeless tobacco: Never Used   Substance and Sexual Activity     Alcohol use: Yes     Alcohol/week: 6.0 standard drinks     Types: 6 Cans of beer per week     Comment: Usually has a couple of beers 2-3 x per week.     Drug use: No     Sexual activity: Yes     Partners: Female   Lifestyle     Physical  "activity     Days per week: Not on file     Minutes per session: Not on file     Stress: Not on file   Relationships     Social connections     Talks on phone: Not on file     Gets together: Not on file     Attends [employer] service: Not on file     Active member of club or organization: Not on file     Attends meetings of clubs or organizations: Not on file     Relationship status: Not on file     Intimate partner violence     Fear of current or ex partner: Not on file     Emotionally abused: Not on file     Physically abused: Not on file     Forced sexual activity: Not on file   Other Topics Concern     Not on file   Social History Narrative     Not on file      Current Outpatient Medications   Medication Sig Dispense Refill     aspirin 81 MG tablet Take 81 mg by mouth bedtime.        atorvastatin (LIPITOR) 40 MG tablet Take 1 tablet (40 mg total) by mouth daily. 90 tablet 3     chlorthalidone (HYGROTEN) 25 MG tablet TAKE 1 TABLET BY MOUTH EVERY DAY 90 tablet 3     cholecalciferol, vitamin D3, 1,000 unit tablet Take 5,000 Units by mouth bedtime.        diclofenac sodium (VOLTAREN) 1 % Gel Apply a quarter sized amount to bilateral hands and rub in thoroughly up to three times daily as needed for pain 1 Tube 0     terazosin (HYTRIN) 5 MG capsule Take 1 capsule (5 mg total) by mouth at bedtime. 90 capsule 3     No current facility-administered medications for this visit.       Objective:   Vital Signs:   Visit Vitals  /88   Pulse 98   Ht 5' 8.5\" (1.74 m)   Wt 193 lb (87.5 kg)   SpO2 97%   BMI 28.92 kg/m           VisionScreening:  No exam data present     PHYSICAL EXAM  Gen: Alert, NAD, appears stated age, normal hygiene   Eyes: conjunctivae without injection, sclera clear, EOMI  ENT/mouth: nares clear, septum midline, absent rhinorrhea, throat without injection, neck is supple, no thyroid enlargement  CV: RRR, no murmur appreciated, pedal edema absent bilaterally  Resp: CTAB, no wheezes, rales or ronchi  ABD: " normoactive, non-tender to palpation, nondistended  MSK: grossly full range of motion in all joints, no obvious deformity  Neuro: CN II-XII grossly intact, no deficits in coordination  Psych: no apparent hallucinations or delusions, no pressured speech; alert, oriented x3  SKIN: dry and without lesions  Heme/lymph: no pallor, no active bleeding/bruising, no adenopathy appreciated      Assessment Results 7/21/2020   Activities of Daily Living No help needed   Instrumental Activities of Daily Living No help needed   Get Up and Go Score Less than 12 seconds   Mini Cog Total Score 5   Some recent data might be hidden     A Mini-Cog score of 0-2 suggests the possibility of dementia, score of 3-5 suggests no dementia    Identified Health Risks:     The patient was counseled and encouraged to consider modifying their diet and eating habits. He was provided with information on recommended healthy diet options.  The patient was provided with written information regarding signs of hearing loss.  He is at risk for falling and has been provided with information to reduce the risk of falling at home.  Patient's advanced directive was discussed and I am comfortable with the patient's wishes.      The 10-year ASCVD risk score (Davidrhonda PALAFOX Jr., et al., 2013) is: 18.5%    Values used to calculate the score:      Age: 70 years      Sex: Male      Is Non- : No      Diabetic: No      Tobacco smoker: No      Systolic Blood Pressure: 126 mmHg      Is BP treated: Yes      HDL Cholesterol: 44 mg/dL      Total Cholesterol: 147 mg/dL

## 2021-06-09 NOTE — TELEPHONE ENCOUNTER
RN cannot approve Refill Request    RN can NOT refill this medication Protocol failed and NO refill given.      Heena Pang, [facility] Triage/Med Refill 6/25/2020    Requested Prescriptions   Pending Prescriptions Disp Refills     chlorthalidone (HYGROTEN) 25 MG tablet [Pharmacy Med Name: CHLORTHALIDONE 25 MG TABLET] 90 tablet 3     Sig: TAKE 1 TABLET BY MOUTH EVERY DAY       Diuretics/Combination Diuretics Refill Protocol  Failed - 6/25/2020 11:00 AM        Failed - Serum Potassium in past 12 months      No results found for: LN-POTASSIUM          Failed - Serum Sodium in past 12 months      No results found for: LN-SODIUM          Failed - Serum Creatinine in past 12 months      Creatinine   Date Value Ref Range Status   06/10/2019 0.96 0.70 - 1.30 mg/dL Final             Passed - Visit with PCP or prescribing provider visit in past 12 months     Last office visit with prescriber/PCP: 7/8/2019 Chiquis Lopez MD OR same dept: 7/8/2019 Chiquis Lopez MD OR same specialty: 7/8/2019 Chiquis Lopez MD  Last physical: 6/10/2019 Last MTM visit: Visit date not found   Next visit within 3 mo: Visit date not found  Next physical within 3 mo: Visit date not found  Prescriber OR PCP: Chiquis Lopez MD  Last diagnosis associated with med order: 1. Essential hypertension  - chlorthalidone (HYGROTEN) 25 MG tablet [Pharmacy Med Name: CHLORTHALIDONE 25 MG TABLET]; TAKE 1 TABLET BY MOUTH EVERY DAY  Dispense: 90 tablet; Refill: 3    If protocol passes may refill for 12 months if within 3 months of last provider visit (or a total of 15 months).             Passed - Blood pressure on file in past 12 months     BP Readings from Last 1 Encounters:   07/08/19 114/80

## 2021-06-09 NOTE — TELEPHONE ENCOUNTER
Spoke with patient who would like prescriptions for Terazosin, chlorthalidone, and atorvastatin sent to the pharmacy for a 90 day supply with three refills.    It does appear he should have refills on file at the pharmacy but he states he would like the prescriptions sent again today so he has a years worth on file at the pharmacy.

## 2021-06-09 NOTE — PROGRESS NOTES
Assessment:     Eduardo was seen today for follow-up, medication refill, skin check and benign prostatic hypertrophy.    Diagnoses and all orders for this visit:    Essential hypertension  -     verapamil (VERELAN PM) 240 MG 24 hr capsule; Take 1 capsule (240 mg total) by mouth bedtime.  -     Renal Function Profile  -     Hemoglobin    BPH (benign prostatic hyperplasia)  -     terazosin (HYTRIN) 5 MG capsule; Take 1 capsule (5 mg total) by mouth bedtime.  -     tadalafil (CIALIS) 5 MG tablet; Take 1 tablet (5 mg total) by mouth daily as needed for erectile dysfunction.    Vitamin D deficiency  -     Vitamin D, Total (25-Hydroxy)    Hypercholesterolemia  -     atorvastatin (LIPITOR) 10 MG tablet; Take 1 tablet (10 mg total) by mouth bedtime.  -     Lipid Cascade  -     Hepatic Profile    Healthcare maintenance  -     Pneumococcal polysaccharide vaccine 23-valent 1 yo or older, subq/IM        Plan:     1. Essential hypertension  Blood pressure is within normal limits and will refill all his blood pressure medicines.  Check a renal and hemoglobin today for kidney disease surveillance  - verapamil (VERELAN PM) 240 MG 24 hr capsule; Take 1 capsule (240 mg total) by mouth bedtime.  Dispense: 90 capsule; Refill: 3  - Renal Function Profile  - Hemoglobin    2. BPH (benign prostatic hyperplasia)  Increased nocturia so will add Cialis to help with BPH.  He also has some ED issues.  Rx printed  - terazosin (HYTRIN) 5 MG capsule; Take 1 capsule (5 mg total) by mouth bedtime.  Dispense: 90 capsule; Refill: 3  - tadalafil (CIALIS) 5 MG tablet; Take 1 tablet (5 mg total) by mouth daily as needed for erectile dysfunction.  Dispense: 30 tablet; Refill: 6    3. Vitamin D deficiency  Previous vitamin D deficiency will monitor  - Vitamin D, Total (25-Hydroxy)    4. Healthcare maintenance  Healthcare maintenance vaccine  - Pneumococcal polysaccharide vaccine 23-valent 1 yo or older, subq/IM    5. Hypercholesterolemia  Lipid  monitoring  - atorvastatin (LIPITOR) 10 MG tablet; Take 1 tablet (10 mg total) by mouth bedtime.  Dispense: 90 tablet; Refill: 3  - Lipid Cascade  - Hepatic Profile      This is a 25 minute visit with greater than 50% of the time spent counseling regarding hypertension and hypercholesterolemia issues and weight monitoring.  Also have a discussion regarding ED and prostate hypertrophy.  Increased nocturia.      Subjective:      Juan David is a 66 y.o. male presenting to my clinic for annual follow-up med check and lab check.  Ya is retired from [employer] and is thriving as a retiree.  He has a motor home that he travels in and In that he's been fixing on.  He's also built a shed in his yard this year and makes cranberry wine.  He has hypertension and hyperlipidemia and is on meds to control his blood pressure and to bring down his lipids.  He's also had some prostate hypertrophy and has benefited from meds for that but he says the hypertrophy seems to be getting worse as some nights he gets up 1-2 times at night to urinate.  He has occasional rectal dysfunction but has not used Viagra.  On last visit we talked about daily use for BPH and some help with a rectal dysfunction.  He wishes to initiate that at this time.  He has no symptoms of chest pain or angina.      He has some posterior reticulocyte symptoms in his thumbs but this improved considerably when he got steroid injections.  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  He tries to reduce the stress in his joints by taking breaks when he works.  This seems to be working.      Current Outpatient Prescriptions on File Prior to Visit   Medication Sig Dispense Refill     aspirin 81 MG tablet Take 81 mg by mouth bedtime.        cholecalciferol, vitamin D3, 1,000 unit tablet Take 5,000 Units by mouth bedtime.        niacin 500 MG CR capsule Take 500 mg by mouth bedtime.       tetrahydrozoline-zinc (VISINE-AC)  0.05-0.25 % ophthalmic solution Administer 2 drops to both eyes 3 (three) times a day as needed.       [DISCONTINUED] verapamil (VERELAN PM) 240 MG 24 hr capsule Take 1 capsule (240 mg total) by mouth bedtime. 90 capsule 3     ALPRAZolam (XANAX) 0.25 MG tablet Take 1 tablet (0.25 mg total) by mouth 3 (three) times a day as needed for anxiety. 30 tablet 0     No current facility-administered medications on file prior to visit.      Allergies   Allergen Reactions     Hay Fever And Allergy Relief      Past Medical History:   Diagnosis Date     Arthritis      Depression     Controlled per patient, on medication     Hayfever     Seasonal per patient     Hyperlipemia     per patient      Hypertension      Prostatitis     History of in past per patient     Past Surgical History:   Procedure Laterality Date     APPENDECTOMY  1995     TOE SURGERY Bilateral     Had bilateral great toe surgery in past     Social History     Social History     Marital status:      Spouse name: N/A     Number of children: N/A     Years of education: N/A     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     Drug use: No     Sexual activity: Yes     Partners: Female     Other Topics Concern     Not on file     Social History Narrative     Family History   Problem Relation Age of Onset     Cancer Mother      Stroke Father      Heart disease Brother        ROS:  I have performed a 10 point ROS.  All pertinent positives and negatives are found in the HPI.  All others are negative.    No angina, no chest pain, no shortness of breath, denies any peripheral neuralgia.    Objective:     Physical Exam:  Visit Vitals     /82 (Patient Site: Right Arm, Patient Position: Sitting, Cuff Size: Adult Regular)     Pulse 72     Wt 197 lb 1.6 oz  (89.4 kg)     BMI 28.28 kg/m2     General Appearance: Alert, cooperative, no distress, appears stated age  Head: Normocephalic, without obvious abnormality, atraumatic his Jacobson having a good time in the LAD.  Neck: Supple, symmetrical, trachea midline, no adenopathy;  thyroid: not enlarged, symmetric, no tenderness/mass/nodules; no carotid bruit or JVD  Lungs: Clear to auscultation bilaterally, respirations unlabored  Heart: Regular rate and rhythm, S1 and S2 normal, no murmur, rub, or gallop  Abdomen: Soft, non-tender, bowel sounds active all four quadrants,  no masses, no organomegaly    Extremities: Extremities normal, atraumatic, no cyanosis or edema/left elbow epicondylar tenderness  Scattered left fifth finger and diffuse OA sites on hands/thumbs stable with decreased pain post steroid injection  Skin: Skin color, texture, turgor normal, no rashes or lesions  Lymph nodes: Cervical, supraclavicular, and axillary nodes normal  Neurologic: Intact, no focal deficits/no neuropathy   Mental status:  Appropriate, Affect normal/no signs of anxiety or depression

## 2021-06-09 NOTE — TELEPHONE ENCOUNTER
Received a fax from [facility] pharmacy the medication diclofenac sodium needs a prior authorization. Please advise.

## 2021-06-10 NOTE — PROGRESS NOTES
Assessment/Plan:     1. Allergic sinusitis  Plan to continue nasal sprays allergy medications.  Manual manipulation of ear canals and recommend short course of steroids.  May consider referral to allergist at this point patient declines call return to care if symptoms worsen or do not improve.  - methylPREDNISolone (MEDROL DOSEPACK) 4 mg tablet; Take 1 tablet (4 mg total) by mouth daily. follow package directions  Dispense: 21 tablet; Refill: 0      Subjective:      Eduardo Collins is a 66 y.o. male comes in today complaining of allergies.  Patient states he always gets a flareup this time of year.  He takes Nasacort and Zyrtec.  He states over the last 5 weeks he has had postnasal drip pressure in his ear symptoms of allergies has some cough with clear mucus worse in the morning and at night.  He has no significant sinus pain has had no fevers.  No shortness of breath.  Cough does not feel tight like asthma.  He states it is better after hot shower but it takes him a while to clear the mucus wonders what else can be done.  My first time meeting with him so briefly reviewed past visit notes.    Current Outpatient Prescriptions   Medication Sig Dispense Refill     aspirin 81 MG tablet Take 81 mg by mouth bedtime.        atorvastatin (LIPITOR) 10 MG tablet Take 1 tablet (10 mg total) by mouth bedtime. 90 tablet 3     cholecalciferol, vitamin D3, 1,000 unit tablet Take 5,000 Units by mouth bedtime.        niacin 500 MG CR capsule Take 500 mg by mouth bedtime.       terazosin (HYTRIN) 5 MG capsule Take 1 capsule (5 mg total) by mouth bedtime. 90 capsule 3     tetrahydrozoline-zinc (VISINE-AC) 0.05-0.25 % ophthalmic solution Administer 2 drops to both eyes 3 (three) times a day as needed.       verapamil (VERELAN PM) 240 MG 24 hr capsule Take 1 capsule (240 mg total) by mouth bedtime. 90 capsule 3     methylPREDNISolone (MEDROL DOSEPACK) 4 mg tablet Take 1 tablet (4 mg total) by mouth daily. follow package directions  21 tablet 0     No current facility-administered medications for this visit.        Past Medical History, Family History, and Social History reviewed.  Past Medical History:   Diagnosis Date     Arthritis      Depression     Controlled per patient, on medication     Hayfever     Seasonal per patient     Hyperlipemia     per patient      Hypertension      Prostatitis     History of in past per patient     Past Surgical History:   Procedure Laterality Date     APPENDECTOMY  1995     TOE SURGERY Bilateral     Had bilateral great toe surgery in past     Hay fever and allergy relief  Family History   Problem Relation Age of Onset     Cancer Mother      Stroke Father      Heart disease Brother      Social History     Social History     Marital status:      Spouse name: N/A     Number of children: N/A     Years of education: N/A     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     Drug use: No     Sexual activity: Yes     Partners: Female     Other Topics Concern     Not on file     Social History Narrative         Review of systems is as stated in HPI, and the remainder of the 10 system review is otherwise negative.    Objective:     Vitals:    05/02/17 0717   BP: 126/78   Pulse: 94   Temp: 98  F (36.7  C)   SpO2: 96%   Weight: 194 lb (88 kg)    Body mass index is 27.84 kg/(m^2).      General Appearance:    Alert, cooperative, no distress, appears stated age   Head:    Normocephalic, without obvious abnormality, atraumatic   Eyes:    PERRL, EOM's intact   Ears:    Normal clear fluid bulge behind tympanic membranes, otherwise external ear canals   Nose:   Mucosa normal, no drainage     or sinus tenderness   Throat:   Oropharynx is clear   Neck:   Supple, symmetrical, no adenopathy, no thyromegally        Lungs:     Clear to auscultation bilaterally, respirations unlabored   Chest Wall:    No tenderness or deformity    Heart:    Regular rate and rhythm, S1 and S2 normal, no murmur, rub    or gallop       Abdomen:     Soft, non-tender, bowel sounds active all four quadrants,     no masses, no organomegaly           Extremities:   Extremities normal, atraumatic, no cyanosis or edema   Pulses:   2+ and symmetric all extremities   Skin:   No rashes or lesions       This note has been dictated using voice recognition software. Any grammatical or context distortions are unintentional and inherent to the the software.
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
## 2021-06-16 NOTE — PROGRESS NOTES
Assessment/Plan:     Patient presents for follow-up status post physical examination.      1. Essential hypertension  -Patient's blood pressures are fairly well controlled on the verapamil,no changes today  -He will monitor his blood pressure once weekly   - If the numbers creep up, he will start logging another week of blood pressures  -At that point he will return to clinic with that log and we will discuss the option of discontinue the verapamil and starting metoprolol given that his pulse is often elevated as well    2. Visit for suture removal  Sutures removed, incision is healing well    Discontinued Medications:  Medications Discontinued During This Encounter   Medication Reason     terazosin (HYTRIN) 5 MG capsule      terazosin (HYTRIN) 5 MG capsule        Return to clinic in 1 year.    Total time spent with patient was 15 minutes with greater than 50% spent in face-to-face counseling regarding the above plan.    This note has been dictated using voice recognition software. Any grammatical or context distortions are unintentional and inherent to the the software.     Chiquis Lopez MD  Family Medicine [facility]      Subjective:      Eduardo Collins is a 67 y.o. male who presents to clinic for suture removal and HTN.    Patient is here for single stitch removal.  He had no concerns and no significant pain with respect to his small procedure.  The pathology did come back with seborrheic keratosis as the diagnosis.    Patient also brought in his blood pressure log.  He took his blood pressure approximately 2-3 times per day.  His systolic measurements are between 114 and 145, most of them are in the 20s or teens.  His systolic is between 66 and 107, but the majority are in the 60s and 70s.      Past Medical History, Family History, and Social History reviewed.   History   Smoking Status     Never Smoker   Smokeless Tobacco     Never Used       Review of systems is as stated in HPI.  The remainder  "of the 10 system review is otherwise negative.    Objective:     /78  Pulse (!) 109  Ht 5' 10\" (1.778 m)  Wt 197 lb (89.4 kg)  SpO2 96%  BMI 28.27 kg/m2 Body mass index is 28.27 kg/(m^2).    Gen: Alert, NAD, appears stated age, normal hygiene   SKIN: dry and without lesions except the healing area where the single suture was removed over a now-known seborrheic keratosis    Current Outpatient Prescriptions on File Prior to Visit   Medication Sig Dispense Refill     aspirin 81 MG tablet Take 81 mg by mouth bedtime.        atorvastatin (LIPITOR) 10 MG tablet TAKE 1 TABLET (10 MG TOTAL) BY MOUTH BEDTIME. 90 tablet 2     cholecalciferol, vitamin D3, 1,000 unit tablet Take 5,000 Units by mouth bedtime.        terazosin (HYTRIN) 5 MG capsule Take 1 capsule (5 mg total) by mouth bedtime. 90 capsule 3     tetrahydrozoline-zinc (VISINE-AC) 0.05-0.25 % ophthalmic solution Administer 2 drops to both eyes 3 (three) times a day as needed.       verapamil (VERELAN PM) 240 MG 24 hr capsule Take 1 capsule (240 mg total) by mouth at bedtime. 90 capsule 3     [DISCONTINUED] terazosin (HYTRIN) 5 MG capsule Take 1 capsule (5 mg total) by mouth at bedtime. 90 capsule 3     [DISCONTINUED] terazosin (HYTRIN) 5 MG capsule TAKE 1 CAPSULE (5 MG TOTAL) BY MOUTH BEDTIME. 90 capsule 2     No current facility-administered medications on file prior to visit.                "

## 2021-06-17 NOTE — PATIENT INSTRUCTIONS - HE
Patient Instructions by Chiquis Lopez MD at 6/10/2019  8:10 AM     Author: Chiquis Lopez MD Service: -- Author Type: Physician    Filed: 6/10/2019  8:16 AM Encounter Date: 6/10/2019 Status: Addendum    : Chiquis Lopez MD (Physician)    Related Notes: Original Note by Chiquis Lopez MD (Physician) filed at 6/10/2019  8:15 AM           Patient Education   Understanding USDA MyPlate  The USDA (US Department of Agriculture) has guidelines to help you make healthy food choices. These are called MyPlate. MyPlate shows the food groups that make up healthy meals using the image of a place setting. Before you eat, think about the healthiest choices for what to put onto your plate or into your cup or bowl. To learn more about building a healthy plate, visit www.choosemyplate.gov.       The Food Groups    Fruits: Any fruit or 100% fruit juice counts as part of the Fruit Group. Fruits may be fresh, canned, frozen, or dried, and may be whole, cut-up, or pureed. Make half your plate fruits and vegetables.    Vegetables: Any vegetable or 100% vegetable juice counts as a member of the Vegetable Group. Vegetables may be fresh, frozen, canned, or dried. They can be served raw or cooked and may be whole, cut-up, or mashed. Make half your plate fruits and vegetables.     Grains: All foods made from grains are part of the Grains Group. These include wheat, rice, oats, cornmeal, and barley such as bread, pasta, oatmeal, cereal, tortillas, and grits. Grains should be no more than a quarter of your plate. At least half of your grains should be whole grains.    Protein: This group includes meat, poultry, seafood, beans and peas, eggs, processed soy products (like tofu), nuts (including nut butters), and seeds. Make protein choices no more than a quarter of your plate. Meat and poultry choices should be lean or low fat.    Dairy: All fluid milk products and foods made from milk that contain calcium, like  yogurt and cheese are part of the Dairy Group. (Foods that have little calcium, such as cream, butter, and cream cheese, are not part of the group.) Most dairy choices should be low-fat or fat-free.    Oils: These are fats that are liquid at room temperature. They include canola, corn, olive, soybean, and sunflower oil. Foods that are mainly oil include mayonnaise, certain salad dressings, and soft margarines. You should have only 5 to 7 teaspoons of oils a day. You probably already get this much from the food you eat.  Use Close.ioer to Help Build Your Meals  The boldUnderline. llccker can help you plan and track your meals and activity. You can look up individual foods to see or compare their nutritional value. You can get guidelines for what and how much you should eat. You can compare your food choices. And you can assess personal physical activities and see ways you can improve. Go to www.SpunLive.gov/WittyParrotcker/.    1379-3236 [employer]. [address] [place] [address]. All rights reserved. This information is not intended as a substitute for professional medical care. Always follow your healthcare professional's instructions.           Patient Education   Signs of Hearing Loss  Hearing loss is a problem shared by many people. In fact, it is one of the most common health conditions, particularly as people age. Most people over age 65 have some hearing loss, and by age 80, almost everyone does. Because hearing loss usually occurs slowly over the years, you may not realize your hearing ability has gotten worse.       Have your hearing checked  Contact your [facility] care provider if you:    Have to strain to hear normal conversation.    Have to watch other peoples faces very carefully to follow what theyre saying.    Need to ask people to repeat what theyve said.    Often misunderstand what people are saying.    Turn the volume of the television or radio up so high that others  complain.    Feel that people are mumbling when theyre talking to you.    Find that the effort to hear leaves you feeling tired and irritated.    Notice, when using the phone, that you hear better with 1 ear than the other.    2143-4488 [employer]. [address] [place] [address]. All rights reserved. This information is not intended as a substitute for professional medical care. Always follow your healthcare professional's instructions.         Patient Education   Preventing Falls in the Home  As you get older, falls are more likely. Thats because your reaction time slows. Your muscles and joints may also get stiffer, making them less flexible. Illness, medications, and vision changes can also affect your balance. A fall could leave you unable to live on your own. To make your home safer, follow these tips:    Floors    Put nonskid pads under area rugs.    Remove throw rugs.    Replace worn floor coverings.    Tack carpets firmly to each step on carpeted stairs. Put nonskid strips on the edges of uncarpeted stairs.    Keep floors and stairs free of clutter and cords.    Arrange furniture so there are clear pathways.    Clean up any spills right away.    Bathrooms    Install grab bars in the tub or shower.    Apply nonskid strips or put a nonskid rubber mat in the tub or shower.    Sit on a bath chair to bathe.    Use bathmats with nonskid backing.    Lighting    Keep a flashlight in each room.    Put a nightlight along the pathway between the bedroom and the bathroom.    9504-8903 [employer]. [address] [place], [place] [address]. All rights reserved. This information is not intended as a substitute for professional medical care. Always follow your healthcare professional's instructions.           Advance Directive  Patients advance directive was discussed and I am comfortable with the patients wishes.  Patient Education   Personalized Prevention Plan  You are due for the  preventive services outlined below.  Your care team is available to assist you in scheduling these services.  If you have already completed any of these items, please share that information with your care team to update in your medical record.  Health Maintenance   Topic Date Due   ? ZOSTER VACCINES (2 of 3) 10/08/2012   ? ADVANCE DIRECTIVES DISCUSSED WITH PATIENT  09/30/2018   ? FALL RISK ASSESSMENT  03/07/2019   ? INFLUENZA VACCINE RULE BASED (Season Ended) 08/01/2019   ? TD 18+ HE  08/16/2021   ? COLONOSCOPY  07/25/2028   ? PNEUMOCOCCAL POLYSACCHARIDE VACCINE AGE 65 AND OVER  Completed   ? PNEUMOCOCCAL CONJUGATE VACCINE FOR ADULTS (PCV13 OR PREVNAR)  Completed        Patient Education     Shoulder Exercises: Internal Rotation    Strengthening exercises help make your injured shoulder more stable. To warm up, do flexibility (stretching) exercises first. Your healthcare provider will tell you what size hand weights to use for the strengthening exercise below. If you dont have hand weights, try using cans of soup instead:    With knees bent, lie on a firm surface. Using the hand on the same side as your injured shoulder, grasp a weight. Bend that arm to a right angle (90 degrees).    Rest your elbow on the floor.    Keeping your elbow next to your side, lower your forearm toward the floor, away from your body. Do not lower your hand all the way to the floor.    Slowly return your forearm to your side. Repeat.    Work up to 5 to 15 lifts.     Note: Support your head and neck with a pillow.   Date Last Reviewed: 9/30/2015 2000-2017 [employer]. [address] [place] [address], [place] [address]. All rights reserved. This information is not intended as a substitute for professional medical care. Always follow your healthcare professional's instructions.           Patient Education     Shoulder External Rotation (Flexibility)    1. Lie on your back on a bed or the floor, with your arms at your side. Bend your  arms at a 90-degree angle. Hold a stick or cane in front of you with both hands, palms down. Keep your upper arms close to your body.  2. Slowly push the stick or cane to the right with your left hand. Keep holding onto the stick or cane with both hands. Keep your elbows close to your body. Feel your right shoulder stretch. Stop at the point of discomfort. Hold for 5 seconds.  3. Slowly move your arms back. Relax.  4. Repeat 5 times, or as instructed.  Date Last Reviewed: 3/10/2016    4788-5565 [employer]. [address] [place] [address]. All rights reserved. This information is not intended as a substitute for professional medical care. Always follow your healthcare professional's instructions.           Patient Education     Shoulder External Rotation, Isometric (Strength)    1. Bend your right arm in front of your body, palm up. Hold your right wrist with your left hand.  2. Try to push your right arm outward, while pulling back with your left arm. Try not to let either arm move. Push and pull both arms firmly in opposite directions.  3. Hold for 5 seconds. Then relax.  4. Repeat 5 times.  5. Repeat this exercise 3 times a day, or as instructed.  Date Last Reviewed: 3/10/2016    [id number] [employer]. [address] [place] [address]. All rights reserved. This information is not intended as a substitute for professional medical care. Always follow your healthcare professional's instructions.

## 2021-06-18 NOTE — PATIENT INSTRUCTIONS - HE
Patient Instructions by Chiquis Lopez MD at 7/21/2020  8:50 AM     Author: Chiquis Lopez MD Service: -- Author Type: Physician    Filed: 7/21/2020  8:48 AM Encounter Date: 7/21/2020 Status: Signed    : Chiquis Lopez MD (Physician)         Patient Education   Understanding USDA MyPlate  The USDA (US Department of Agriculture) has guidelines to help you make healthy food choices. These are called MyPlate. MyPlate shows the food groups that make up healthy meals using the image of a place setting. Before you eat, think about the healthiest choices for what to put onto your plate or into your cup or bowl. To learn more about building a healthy plate, visit www.choosemyplate.gov.       The Food Groups    Fruits: Any fruit or 100% fruit juice counts as part of the Fruit Group. Fruits may be fresh, canned, frozen, or dried, and may be whole, cut-up, or pureed. Make half your plate fruits and vegetables.    Vegetables: Any vegetable or 100% vegetable juice counts as a member of the Vegetable Group. Vegetables may be fresh, frozen, canned, or dried. They can be served raw or cooked and may be whole, cut-up, or mashed. Make half your plate fruits and vegetables.     Grains: All foods made from grains are part of the Grains Group. These include wheat, rice, oats, cornmeal, and barley such as bread, pasta, oatmeal, cereal, tortillas, and grits. Grains should be no more than a quarter of your plate. At least half of your grains should be whole grains.    Protein: This group includes meat, poultry, seafood, beans and peas, eggs, processed soy products (like tofu), nuts (including nut butters), and seeds. Make protein choices no more than a quarter of your plate. Meat and poultry choices should be lean or low fat.    Dairy: All fluid milk products and foods made from milk that contain calcium, like yogurt and cheese are part of the Dairy Group. (Foods that have little calcium, such as cream, butter,  and cream cheese, are not part of the group.) Most dairy choices should be low-fat or fat-free.    Oils: These are fats that are liquid at room temperature. They include canola, corn, olive, soybean, and sunflower oil. Foods that are mainly oil include mayonnaise, certain salad dressings, and soft margarines. You should have only 5 to 7 teaspoons of oils a day. You probably already get this much from the food you eat.  Use [employer] to Help Build Your Meals  The [employer] can help you plan and track your meals and activity. You can look up individual foods to see or compare their nutritional value. You can get guidelines for what and how much you should eat. You can compare your food choices. And you can assess personal physical activities and see ways you can improve. Go to www.DermaMedics.gov/Sparkcloudcker/.    2904-0320 The [employer]. [address] [place] [address]. All rights reserved. This information is not intended as a substitute for professional medical care. Always follow your healthcare professional's instructions.           Patient Education   Signs of Hearing Loss  Hearing loss is a problem shared by many people. In fact, it is one of the most common health conditions, particularly as people age. Most people over age 65 have some hearing loss, and by age 80, almost everyone does. Because hearing loss usually occurs slowly over the years, you may not realize your hearing ability has gotten worse.       Have your hearing checked  Contact your [facility] care provider if you:    Have to strain to hear normal conversation.    Have to watch other peoples faces very carefully to follow what theyre saying.    Need to ask people to repeat what theyve said.    Often misunderstand what people are saying.    Turn the volume of the television or radio up so high that others complain.    Feel that people are mumbling when theyre talking to you.    Find that the effort to hear leaves you  feeling tired and irritated.    Notice, when using the phone, that you hear better with 1 ear than the other.    8085-3524 [employer]. [address] [place] [address]. All rights reserved. This information is not intended as a substitute for professional medical care. Always follow your healthcare professional's instructions.         Patient Education   Preventing Falls in the Home  As you get older, falls are more likely. Thats because your reaction time slows. Your muscles and joints may also get stiffer, making them less flexible. Illness, medications, and vision changes can also affect your balance. A fall could leave you unable to live on your own. To make your home safer, follow these tips:    Floors    Put nonskid pads under area rugs.    Remove throw rugs.    Replace worn floor coverings.    Tack carpets firmly to each step on carpeted stairs. Put nonskid strips on the edges of uncarpeted stairs.    Keep floors and stairs free of clutter and cords.    Arrange furniture so there are clear pathways.    Clean up any spills right away.    Bathrooms    Install grab bars in the tub or shower.    Apply nonskid strips or put a nonskid rubber mat in the tub or shower.    Sit on a bath chair to bathe.    Use bathmats with nonskid backing.    Lighting    Keep a flashlight in each room.    Put a nightlight along the pathway between the bedroom and the bathroom.    8110-9898 [employer]. [address] [place] [address]. All rights reserved. This information is not intended as a substitute for professional medical care. Always follow your healthcare professional's instructions.           Advance Directive  Patients advance directive was discussed and I am comfortable with the patients wishes.  Patient Education   Personalized Prevention Plan  You are due for the preventive services outlined below.  Your care team is available to assist you in scheduling these services.  If  you have already completed any of these items, please share that information with your care team to update in your medical record.  Health Maintenance   Topic Date Due   ? MEDICARE ANNUAL WELLNESS VISIT  06/10/2020   ? INFLUENZA VACCINE RULE BASED (1) 08/01/2020   ? FALL RISK ASSESSMENT  07/21/2021   ? TD 18+ HE  08/16/2021   ? ADVANCE CARE PLANNING  06/10/2024   ? LIPID  07/08/2025   ? COLORECTAL CANCER SCREENING  07/25/2028   ? HEPATITIS C SCREENING  Completed   ? PNEUMOCOCCAL IMMUNIZATION 65+ LOW/MEDIUM RISK  Completed   ? ZOSTER VACCINES  Completed   ? HEPATITIS B VACCINES  Aged Out

## 2021-06-19 NOTE — LETTER
Letter by Chiquis Lopez MD at      Author: Chiquis Lopez MD Service: -- Author Type: --    Filed:  Encounter Date: 6/10/2019 Status: (Other)         Eduardo Collins  [address] [facility] [address]             Yoselyn 10, 2019         Dear Mr. Collins,    Below are the results from your recent visit:    Resulted Orders   Lipid [place] FASTING   Result Value Ref Range    Cholesterol 136 <=199 mg/dL    Triglycerides 86 <=149 mg/dL    HDL Cholesterol 47 >=40 mg/dL    LDL Calculated 72 <=129 mg/dL    Patient Fasting > 8hrs? Yes    Comprehensive Metabolic Panel   Result Value Ref Range    Sodium 140 136 - 145 mmol/L    Potassium 4.9 3.5 - 5.0 mmol/L    Chloride 105 98 - 107 mmol/L    CO2 25 22 - 31 mmol/L    Anion Gap, Calculation 10 5 - 18 mmol/L    Glucose 90 70 - 125 mg/dL    BUN 13 8 - 22 mg/dL    Creatinine 0.96 0.70 - 1.30 mg/dL    GFR MDRD Af Amer >60 >60 mL/min/1.73m2    GFR MDRD Non Af Amer >60 >60 mL/min/1.73m2    Bilirubin, Total 0.9 0.0 - 1.0 mg/dL    Calcium 9.7 8.5 - 10.5 mg/dL    Protein, Total 7.0 6.0 - 8.0 g/dL    Albumin 4.1 3.5 - 5.0 g/dL    Alkaline Phosphatase 82 45 - 120 U/L    AST 24 0 - 40 U/L    ALT 24 0 - 45 U/L    Narrative    Fasting Glucose reference range is 70-99 mg/dL per  American Diabetes Association (ADA) guidelines.   Hepatitis C Antibody (Anti-HCV)   Result Value Ref Range    Hepatitis C Ab Negative Negative     The 10-year ASCVD risk score (David PALAFOX Jr., et al., 2013) is: 17.7%    Values used to calculate the score:      Age: 69 years      Sex: Male      Is Non- : No      Diabetic: No      Tobacco smoker: No      Systolic Blood Pressure: 134 mmHg      Is BP treated: Yes      HDL Cholesterol: 47 mg/dL      Total Cholesterol: 136 mg/dL    Although your cholesterol is excellent, your risk of stroke and heart attack in the next 10 years is 17.7%.  This is high enough that I do feel that you need to be on a statin medication.  You are currently on  Lipitor 10 mg, I would increase to Lipitor 40.  If you have any concerns about this, or questions, please do not hesitate to reach out.    Please call with questions or contact us using [employer].    Sincerely,        Electronically signed by Chiquis Lopez MD

## 2021-06-19 NOTE — PROGRESS NOTES
Assessment/Plan:     1. Tick bite of back, initial encounter         Diagnoses and all orders for this visit:    Tick bite of back, initial encounter    Other orders  -     doxycycline (VIBRA-TABS) 100 MG tablet; Take 2 tablets (200 mg total) by mouth once for 1 dose.  Dispense: 2 tablet; Refill: 0       We will treat with prophylactic doxycycline as he does meet criteria for this.  Prescription sent to pharmacy.  Counseled on medication.  Discussed it would not be beneficial to check Lyme serology at this point.  He should follow-up with primary care provider if he develops erythema multiforme, flulike symptoms or other symptoms associated with acute Lyme's disease.  He was comfortable with this plan.        Subjective:      Eduardo Collins is a 68 y.o. male who comes in today concerned about a deer tick bite.  He was out working in his yard on Saturday clearing some brush.  He frequently pulls off wood tics and deer ticks.  He did a thorough examination of himself on Saturday after he was done working in his yard.  Did not notice any ticks that were attached at that time.  This morning when he was eating ready to take a shower he noticed a deer tick attached to the skin behind his right upper arm.  He states that he was able to brush it off and noticed that there is a little red spot left over at the site that the tick was attached.  He believes that it was on him since Saturday.  He has not had any significant discomfort at the site of the tick bite.  Did apply some topical antibiotic ointment.  He has otherwise been feeling well.  He does have joint pains related to osteoarthritis but not worse than usual.  Has not had fevers, chills or flulike symptoms.  Has not noticed any bull's-eye rash.  We reviewed his medications and allergies.  Chart is updated.  No other concerns or questions today.  Incidentally he reports that he did receive the Lyme's vaccine series about 20 years ago.    Current Outpatient  Prescriptions   Medication Sig Dispense Refill     aspirin 81 MG tablet Take 81 mg by mouth bedtime.        atorvastatin (LIPITOR) 10 MG tablet TAKE 1 TABLET (10 MG TOTAL) BY MOUTH BEDTIME. 90 tablet 2     cholecalciferol, vitamin D3, 1,000 unit tablet Take 5,000 Units by mouth bedtime.        terazosin (HYTRIN) 5 MG capsule Take 1 capsule (5 mg total) by mouth bedtime. 90 capsule 3     verapamil (VERELAN PM) 240 MG 24 hr capsule Take 1 capsule (240 mg total) by mouth at bedtime. 90 capsule 3     doxycycline (VIBRA-TABS) 100 MG tablet Take 2 tablets (200 mg total) by mouth once for 1 dose. 2 tablet 0     No current facility-administered medications for this visit.        Past Medical History, Family History, and Social History reviewed.  Past Medical History:   Diagnosis Date     Arthritis      Depression     Controlled per patient, on medication     Hayfever     Seasonal per patient     Hyperlipemia     per patient      Hypertension      Prostatitis     History of in past per patient     Past Surgical History:   Procedure Laterality Date     APPENDECTOMY  1995     TOE SURGERY Bilateral     Had bilateral great toe surgery in past     Hay fever and allergy relief  Family History   Problem Relation Age of Onset     Cancer Mother      Stroke Father      Heart disease Brother      Social History     Social History     Marital status:      Spouse name: N/A     Number of children: N/A     Years of education: N/A     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     Drug use: No     Sexual activity: Yes     Partners: Female     Other Topics Concern     Not on file     Social History Narrative         Review of systems is as stated in HPI, and the remainder of the 10 system review is otherwise  negative.    Objective:     Vitals:    07/30/18 1439   BP: 120/80   Patient Site: Right Arm   Patient Position: Sitting   Cuff Size: Adult Large   Pulse: (!) 104   Temp: 98  F (36.7  C)   TempSrc: Oral   SpO2: 96%   Weight: 194 lb 3 oz (88.1 kg)    Body mass index is 27.86 kg/(m^2).        General appearance: alert, appears stated age and cooperative  Skin: Small erythematous papular lesion present posterior to right axilla at site of tick bite      This note has been dictated using voice recognition software. Any grammatical or context distortions are unintentional and inherent to the the software.

## 2021-06-20 NOTE — LETTER
Letter by Chiquis Lopez MD at      Author: Chiquis Lopez MD Service: -- Author Type: --    Filed:  Encounter Date: 7/9/2020 Status: (Other)         Eduardo Collins  [address] [facility] [address]             July 9, 2020         Dear Mr. Collins,    Below are the results from your recent visit:    Resulted Orders   Comprehensive Metabolic Panel   Result Value Ref Range    Sodium 138 136 - 145 mmol/L    Potassium 3.4 (L) 3.5 - 5.0 mmol/L    Chloride 100 98 - 107 mmol/L    CO2 27 22 - 31 mmol/L    Anion Gap, Calculation 11 5 - 18 mmol/L    Glucose 102 70 - 125 mg/dL    BUN 20 8 - 28 mg/dL    Creatinine 1.05 0.70 - 1.30 mg/dL    GFR MDRD Af Amer >60 >60 mL/min/1.73m2    GFR MDRD Non Af Amer >60 >60 mL/min/1.73m2    Bilirubin, Total 1.0 0.0 - 1.0 mg/dL    Calcium 9.8 8.5 - 10.5 mg/dL    Protein, Total 7.3 6.0 - 8.0 g/dL    Albumin 4.1 3.5 - 5.0 g/dL    Alkaline Phosphatase 84 45 - 120 U/L    AST 21 0 - 40 U/L    ALT 28 0 - 45 U/L    Narrative    Fasting Glucose reference range is 70-99 mg/dL per  American Diabetes Association (ADA) guidelines.   Lipid [place] RANDOM   Result Value Ref Range    Cholesterol 147 <=199 mg/dL    Triglycerides 122 <=149 mg/dL    HDL Cholesterol 44 >=40 mg/dL    LDL Calculated 79 <=129 mg/dL    Patient Fasting > 8hrs? Yes        Your potassium is technically slightly low but nothing that I would worry about. You could eat more bananas and potatoes if you wanted. Otherwise, everything looks good!    Please call with questions or contact us using [employer].    Sincerely,        Electronically signed by Chiquis Lopez MD

## 2021-07-01 ENCOUNTER — HOSPITAL ENCOUNTER (EMERGENCY)
Dept: EMERGENCY MEDICINE | Facility: HOSPITAL | Age: 71
Discharge: HOME | End: 2021-07-01
Attending: STUDENT IN AN ORGANIZED HEALTH CARE EDUCATION/TRAINING PROGRAM
Payer: MEDICARE

## 2021-07-01 LAB
ALBUMIN SERPL-MCNC: 3.8 G/DL (ref 3.5–5)
ALP SERPL-CCNC: 88 U/L (ref 45–120)
ALT SERPL W P-5'-P-CCNC: 26 U/L (ref 0–45)
ANION GAP SERPL CALCULATED.3IONS-SCNC: 10 MMOL/L (ref 5–18)
APTT PPP: 32 SECONDS (ref 24–37)
AST SERPL W P-5'-P-CCNC: 26 U/L (ref 0–40)
ATRIAL RATE - MUSE: 84 BPM
BASOPHILS # BLD AUTO: 0.1 THOU/UL (ref 0–0.2)
BASOPHILS NFR BLD AUTO: 1 % (ref 0–2)
BILIRUB SERPL-MCNC: 1.6 MG/DL (ref 0–1)
BNP SERPL-MCNC: 16 PG/ML (ref 0–69)
BUN SERPL-MCNC: 17 MG/DL (ref 8–28)
CALCIUM SERPL-MCNC: 9.2 MG/DL (ref 8.5–10.5)
CHLORIDE BLD-SCNC: 104 MMOL/L (ref 98–107)
CO2 SERPL-SCNC: 25 MMOL/L (ref 22–31)
CREAT SERPL-MCNC: 1.05 MG/DL (ref 0.7–1.3)
D DIMER PPP FEU-MCNC: 0.54 FEU UG/ML
DIASTOLIC BLOOD PRESSURE - MUSE: NORMAL
EOSINOPHIL # BLD AUTO: 0.3 THOU/UL (ref 0–0.4)
EOSINOPHIL NFR BLD AUTO: 4 % (ref 0–6)
ERYTHROCYTE [DISTWIDTH] IN BLOOD BY AUTOMATED COUNT: 12.8 % (ref 11–14.5)
GFR SERPL CREATININE-BSD FRML MDRD: >60 ML/MIN/1.73M2
GLUCOSE BLD-MCNC: 113 MG/DL (ref 70–125)
HCT VFR BLD AUTO: 43.1 % (ref 40–54)
HGB BLD-MCNC: 15.2 G/DL (ref 14–18)
IMM GRANULOCYTES # BLD: 0 THOU/UL
IMM GRANULOCYTES NFR BLD: 0 %
INR PPP: 1.1 (ref 0.9–1.1)
INTERPRETATION ECG - MUSE: NORMAL
LYMPHOCYTES # BLD AUTO: 1.9 THOU/UL (ref 0.8–4.4)
LYMPHOCYTES NFR BLD AUTO: 20 % (ref 20–40)
MAGNESIUM SERPL-MCNC: 3.2 MG/DL (ref 1.8–2.6)
MCH RBC QN AUTO: 30.8 PG (ref 27–34)
MCHC RBC AUTO-ENTMCNC: 35.3 G/DL (ref 32–36)
MCV RBC AUTO: 87 FL (ref 80–100)
MONOCYTES # BLD AUTO: 0.9 THOU/UL (ref 0–0.9)
MONOCYTES NFR BLD AUTO: 10 % (ref 2–10)
NEUTROPHILS # BLD AUTO: 6.1 THOU/UL (ref 2–7.7)
NEUTROPHILS NFR BLD AUTO: 65 % (ref 50–70)
P AXIS - MUSE: 72 DEGREES
PLATELET # BLD AUTO: 170 THOU/UL (ref 140–440)
PMV BLD AUTO: 9.7 FL (ref 8.5–12.5)
POTASSIUM BLD-SCNC: 3.2 MMOL/L (ref 3.5–5)
PR INTERVAL - MUSE: 160 MS
PROT SERPL-MCNC: 7 G/DL (ref 6–8)
QRS DURATION - MUSE: 74 MS
QT - MUSE: 360 MS
QTC - MUSE: 425 MS
R AXIS - MUSE: 69 DEGREES
RBC # BLD AUTO: 4.94 MILL/UL (ref 4.4–6.2)
SODIUM SERPL-SCNC: 139 MMOL/L (ref 136–145)
SYSTOLIC BLOOD PRESSURE - MUSE: NORMAL
T AXIS - MUSE: 77 DEGREES
TROPONIN I SERPL-MCNC: <0.01 NG/ML (ref 0–0.29)
TROPONIN I SERPL-MCNC: <0.01 NG/ML (ref 0–0.29)
VENTRICULAR RATE- MUSE: 84 BPM
WBC: 9.4 THOU/UL (ref 4–11)

## 2021-07-01 ASSESSMENT — MIFFLIN-ST. JEOR: SCORE: 1659.37

## 2021-07-03 NOTE — ADDENDUM NOTE
Addendum Note by Chiquis Sim MD at 6/10/2019  8:10 AM     Author: Chiquis Sim MD Service: -- Author Type: Physician    Filed: 6/10/2019  3:16 PM Encounter Date: 6/10/2019 Status: Signed    : Chiquis Sim MD (Physician)    Addended by: CHIQUIS SIM on: 6/10/2019 03:16 PM        Modules accepted: Orders

## 2021-07-04 NOTE — PROGRESS NOTES
Progress Notes by Opal Hong, Maria E at 7/1/2021  9:42 AM     Author: Opal Hong PharmD Service: Pharmacy Author Type: Pharmacist    Filed: 7/1/2021  9:43 AM Date of Service: 7/1/2021  9:42 AM Status: Signed    : Opal Hong PharmD (Pharmacist)       Pharmacy Note - Admission Medication History    Pertinent Provider Information:      ______________________________________________________________________    Prior To Admission (PTA) med list completed and updated in EMR.       PTA Med List   Medication Sig Last Dose   ? aspirin 81 MG tablet Take 81 mg by mouth bedtime.  6/30/2021 at PM   ? atorvastatin (LIPITOR) 40 MG tablet Take 1 tablet (40 mg total) by mouth daily. 6/30/2021 at PM   ? chlorthalidone (HYGROTEN) 25 MG tablet Take 1 tablet (25 mg total) by mouth daily. 6/30/2021 at PM   ? cholecalciferol, vitamin D3, 125 mcg (5,000 unit) TbDL Take 5,000 Units by mouth once a week. Takes on Mondays  Taking one a week during summer and then daily in the winter 6/28/2021 at Unknown time   ? terazosin (HYTRIN) 5 MG capsule Take 1 capsule (5 mg total) by mouth at bedtime. 6/30/2021 at PM   ? tetrahydrozoline-zinc (VISINE-AC) 0.05-0.25 % ophthalmic solution Administer to both eyes 4 (four) times a day as needed. 6/29/2021 at Unknown time   ? triamcinolone (NASACORT) 55 mcg nasal inhaler Apply 1-2 sprays into each nostril 2 (two) times a day as needed. 6/29/2021 at Unknown time       Information source(s): Patient, Family member and patient's medication list  Method of interview communication: in-person    Summary of Changes to PTA Med List  New: Visine, Nasacort  Discontinued: None  Changed: None    Patient was asked about OTC/herbal products specifically.  PTA med list reflects this.    In the past week, patient estimated taking medication this percent of the time:  greater than 90%.    Allergies were reviewed, assessed, and updated with the patient.      Patient does not anticipate needing any multi-use  medications during admission.    The information provided in this note is only as accurate as the sources available at the time of the update(s).    Thank you for the opportunity to participate in the care of this patient.    Opal Hong, PharmD  7/1/2021 9:42 AM

## 2021-07-04 NOTE — ED TRIAGE NOTES
ED Triage Notes by Ayse Adair RN at 7/1/2021  8:16 AM     Author: Ayse Adair RN Service: -- Author Type: Registered Nurse    Filed: 7/1/2021  8:17 AM Date of Service: 7/1/2021  8:16 AM Status: Signed    : Ayse Adair RN (Registered Nurse)       Patient presents here as a referral from an urgent care for evaluation of chest tightness and irregular heart rhythm.

## 2021-07-04 NOTE — ED PROVIDER NOTES
ED Provider Notes by Leopoldo Donaldson DO at 7/1/2021  8:21 AM     Author: Leopoldo Donaldson DO Service: Emergency Medicine Author Type: Physician    Filed: 7/1/2021  3:21 PM Date of Service: 7/1/2021  8:21 AM Status: Signed    : Leopoldo Donaldson DO (Physician)       EMERGENCY DEPARTMENT ENCOUnter      NAME: Eduardo Collins  AGE: 71 y.o. male  YOB: 1950  MRN: [id number]  EVALUATION DATE & TIME: 7/1/2021  8:19 AM    PCP: Chiquis Lopez MD    ED PROVIDER: Leopoldo Donaldson DO      Chief Complaint   Patient presents with   ? Irregular Heart Beat         FINAL IMPRESSION:  Chest pain        ED COURSE & MEDICAL DECISION MAKING:        ED Course as of Jul 01 1521   Thu Jul 01, 2021   0842 Ekg: NSR with several PVCs which appear to be causing pauses in the heartbeat, normal QTC at 425, no STEMI, no aly signs of ischemia    [SO]   0944 Patient is a 71-year-old hypertensive male here with chest pain.  Patient states he was working in the yard with a chainsaw was significantly exerting himself and states after that he began having chest pain approximate 2 hours after.  States he continued to have chest pain after his after dinner walk with his wife.  No nausea or diaphoresis.  No shortness of breath or presyncope.  States he woke up this morning and had persisting upper quadrant epigastric discomfort with no radiation into his back or his abdomen.  No vomiting or bowel movement changes.  No leg swelling.  Arrival he looks well.  Is complaining of epigastric discomfort on arrival.  EKG as above.  Physical examination overall is unremarkable.  No rash on his skin, Kingston sign or abdominal pain.  No pain to palpation along his right thoracic wall.  No bruising or abrasions.  Patient's heart score is 3 at this time, and at this point I think we should probably admit patient for further cardiac telemetry monitoring.    [SO]      ED Course User Index  [SO] Leopoldo Donaldson DO       -Patient states he feels  better on discharge.  Second troponin negative.  -D-dimer is elevated however age-adjusted is normal.  -As far as patient's risk stratification, he still has a heart score of 3.  I think it is reasonable for him to go home with close outpatient follow-up.  Patient states he had absolutely no pain when he was exerting himself using a chainsaw and dragging branches.  If he did have some sort of critical CAD I would expect that he would have symptoms during that not after dinner.  This still could be indigestion versus muscle strain.  Nonetheless, I think an outpatient cardiology consultation and stress test would be reasonable.  Patient is answered.  Patient comfortable with the plan.              8:30 AM the resident met the patient.   9:29 AM I met with the patient, obtained history, performed an initial exam, and discussed options and plan for diagnostics and treatment here in the ED.                At the conclusion of the encounter I discussed  the results of all of the tests and the disposition with patient.   All questions were answered.  The patient acknowledged understanding and was involved in the decision making regarding the overall care plan.       I discussed with patient the utility, limitations and findings of the exam/interventions/studies done during this visit as well as the list of differential diagnosis and symptoms to monitor/return to ER for.  Additional verbal discharge instructions were provided.           MEDICATIONS GIVEN IN THE EMERGENCY:  Medications   sodium chloride flush 10 mL (NS) (has no administration in time range)   aspirin EC tablet 325 mg (has no administration in time range)   aluminum-magnesium hydroxide-simethicone 15 mL, viscous lidocaine HC 15 mL (GI COCKTAIL) (30 mL Oral Given 7/1/21 0949)   nitroglycerin SL tablet 0.4 mg (NITROSTAT) (0.4 mg Sublingual Given 7/1/21 0928)       NEW PRESCRIPTIONS STARTED AT TODAY'S ER VISIT  Current Discharge Medication List      CONTINUE  these medications which have NOT CHANGED    Details   aspirin 81 MG tablet Take 81 mg by mouth bedtime.       atorvastatin (LIPITOR) 40 MG tablet Take 1 tablet (40 mg total) by mouth daily.  Qty: 90 tablet, Refills: 3    Associated Diagnoses: Pure hypercholesterolemia      chlorthalidone (HYGROTEN) 25 MG tablet Take 1 tablet (25 mg total) by mouth daily.  Qty: 90 tablet, Refills: 3    Associated Diagnoses: Essential hypertension      cholecalciferol, vitamin D3, 125 mcg (5,000 unit) TbDL Take 5,000 Units by mouth once a week. Takes on Mondays  Taking one a week during summer and then daily in the winter      terazosin (HYTRIN) 5 MG capsule Take 1 capsule (5 mg total) by mouth at bedtime.  Qty: 90 capsule, Refills: 3    Associated Diagnoses: BPH (benign prostatic hyperplasia)      tetrahydrozoline-zinc (VISINE-AC) 0.05-0.25 % ophthalmic solution Administer to both eyes 4 (four) times a day as needed.      triamcinolone (NASACORT) 55 mcg nasal inhaler Apply 1-2 sprays into each nostril 2 (two) times a day as needed.                =================================================================    HPI    Patient information was obtained from: patient    Use of Intrepreter: N/A         Eduardo Collins is a 71 y.o. male with history of HTN, HLD who presents with chest pain    The patient states that yesterday he was working in yard with a chainsaw exerting himself. Two hours later he developed intermittent substernal chest pain and felt clammy. Does not radiate to his back or abdomen. It lasted through the evening and persisted this morning so he came in for evaluation. Currently rates his chest pain 3/10. No aggravating factors reported. He also notes when taking his pulse this morning it felt irregular. He reports having a cough for a couple of days. He denies any fever, chills, nausea, vomiting, shortness of breath, lightheadedness, bowel changes, leg swelling, or other symptoms.    Reports family history of  a-fib    REVIEW OF SYSTEMS   Review of Systems   Constitutional: positive for diaphoresis (clammy). Negative for fever, chills and fatigue.   HENT: Negative for neck pain.    Eyes: Negative for visual disturbance.   Respiratory: positive for cough. Negative for chest tightness and shortness of breath.    Cardiovascular: positive for chest pain, palpitations. Negative for leg swelling  Gastrointestinal: Negative for nausea, vomiting, abdominal pain, constipation and diarrhea.   Genitourinary: Negative for dysuria.   Musculoskeletal: Negative for back pain.   Skin: Negative for color change.   Neurological: Negative for lightheadedness, dizziness, weakness and numbness.   All other systems reviewed and are negative.    PAST MEDICAL HISTORY:  Past Medical History:   Diagnosis Date   ? Arthritis    ? Depression     Controlled per patient, on medication   ? Hayfever     Seasonal per patient   ? Hyperlipemia     per patient    ? Hypertension    ? Prostatitis     History of in past per patient       PAST SURGICAL HISTORY:  Past Surgical History:   Procedure Laterality Date   ? APPENDECTOMY  1995   ? TOE SURGERY Bilateral     Had bilateral great toe surgery in past           CURRENT MEDICATIONS:    No current facility-administered medications on file prior to encounter.      Current Outpatient Medications on File Prior to Encounter   Medication Sig   ? aspirin 81 MG tablet Take 81 mg by mouth bedtime.    ? atorvastatin (LIPITOR) 40 MG tablet Take 1 tablet (40 mg total) by mouth daily.   ? chlorthalidone (HYGROTEN) 25 MG tablet Take 1 tablet (25 mg total) by mouth daily.   ? cholecalciferol, vitamin D3, 125 mcg (5,000 unit) TbDL Take 5,000 Units by mouth once a week. Takes on Mondays  Taking one a week during summer and then daily in the winter   ? terazosin (HYTRIN) 5 MG capsule Take 1 capsule (5 mg total) by mouth at bedtime.   ? tetrahydrozoline-zinc (VISINE-AC) 0.05-0.25 % ophthalmic solution Administer to both eyes 4  (four) times a day as needed.   ? triamcinolone (NASACORT) 55 mcg nasal inhaler Apply 1-2 sprays into each nostril 2 (two) times a day as needed.   ? [DISCONTINUED] diclofenac sodium (VOLTAREN) 1 % Gel Apply a quarter sized amount to bilateral hands and rub in thoroughly up to three times daily as needed for pain       ALLERGIES:  Allergies   Allergen Reactions   ? Hay Fever And Allergy Relief        FAMILY HISTORY:  Family History   Problem Relation Age of Onset   ? Cancer Mother         skin and multiple myeloma   ? Stroke Father    ? Heart disease Brother    ? Hypertension Brother        SOCIAL HISTORY:   Social History     Socioeconomic History   ? Marital status:      Spouse name: None   ? Number of children: None   ? Years of education: None   ? Highest education level: None   Occupational History   ? Occupation:      Employer: [employer]   Social Needs   ? Financial resource strain: None   ? Food insecurity     Worry: None     Inability: None   ? Transportation needs     Medical: None     Non-medical: None   Tobacco Use   ? Smoking status: Never Smoker   ? Smokeless tobacco: Never Used   Substance and Sexual Activity   ? Alcohol use: Yes     Alcohol/week: 6.0 standard drinks     Types: 6 Cans of beer per week     Comment: Usually has a couple of beers 2-3 x per week.   ? Drug use: No   ? Sexual activity: Yes     Partners: Female   Lifestyle   ? Physical activity     Days per week: None     Minutes per session: None   ? Stress: None   Relationships   ? Social connections     Talks on phone: None     Gets together: None     Attends Mandaeism service: None     Active member of club or organization: None     Attends meetings of clubs or organizations: None     Relationship status: None   ? Intimate partner violence     Fear of current or ex partner: None     Emotionally abused: None     Physically abused: None     Forced sexual activity: None   Other Topics Concern   ? None   Social History  "Narrative   ? None       VITALS:  Patient Vitals for the past 24 hrs:   BP Temp Temp src Pulse Resp SpO2 Height Weight   07/01/21 0930 138/85 -- -- 83 28 96 % -- --   07/01/21 0825 119/81 -- -- 88 17 97 % -- --   07/01/21 0814 (!) 142/91 98.4  F (36.9  C) Oral 89 16 95 % 5' 10\" (1.778 m) 198 lb (89.8 kg)           PHYSICAL EXAM    Constitutional:  Well developed, well nourished, no acute distress   EYES: Conjunctivae clear, EOMI  HENT:  Atraumatic, external ears normal, nose normal, oropharynx moist. Neck- supple   Respiratory:  No respiratory distress, normal breath sounds, no rales, no wheezing   Cardiovascular:  Normal rate, normal rhythm, no murmurs, capillary refill normal.  No tenderness to palpation along right thoracic wall  GI:  Soft, nondistended, nontender, negative Kingston's sign, no palpable masses, no rebound, no guarding   : No CVA tenderness  Musculoskeletal:  No edema.  Range of motion major extremities intact. No tenderness to palpation or major deformities noted.    Integument: Warm, Dry, No erythema, No rash. No bruising or abrasions.   Neurologic:  Alert & oriented, no focal deficits noted, ambulatory  Psych: Affect normal, Mood normal.       LAB:  All pertinent labs reviewed and interpreted.  Results for orders placed or performed during the hospital encounter of 07/01/21   APTT(PTT)   Result Value Ref Range    PTT 32 24 - 37 seconds   INR   Result Value Ref Range    INR 1.10 0.90 - 1.10   Troponin I   Result Value Ref Range    Troponin I <0.01 0.00 - 0.29 ng/mL   Magnesium   Result Value Ref Range    Magnesium 3.2 (H) 1.8 - 2.6 mg/dL   Comprehensive Metabolic Panel   Result Value Ref Range    Sodium 139 136 - 145 mmol/L    Potassium 3.2 (L) 3.5 - 5.0 mmol/L    Chloride 104 98 - 107 mmol/L    CO2 25 22 - 31 mmol/L    Anion Gap, Calculation 10 5 - 18 mmol/L    Glucose 113 70 - 125 mg/dL    BUN 17 8 - 28 mg/dL    Creatinine 1.05 0.70 - 1.30 mg/dL    GFR MDRD Af Amer >60 >60 mL/min/1.73m2    GFR " MDRD Non Af Amer >60 >60 mL/min/1.73m2    Bilirubin, Total 1.6 (H) 0.0 - 1.0 mg/dL    Calcium 9.2 8.5 - 10.5 mg/dL    Protein, Total 7.0 6.0 - 8.0 g/dL    Albumin 3.8 3.5 - 5.0 g/dL    Alkaline Phosphatase 88 45 - 120 U/L    AST 26 0 - 40 U/L    ALT 26 0 - 45 U/L   HM1 (CBC with Diff)   Result Value Ref Range    WBC 9.4 4.0 - 11.0 thou/uL    RBC 4.94 4.40 - 6.20 mill/uL    Hemoglobin 15.2 14.0 - 18.0 g/dL    Hematocrit 43.1 40.0 - 54.0 %    MCV 87 80 - 100 fL    MCH 30.8 27.0 - 34.0 pg    MCHC 35.3 32.0 - 36.0 g/dL    RDW 12.8 11.0 - 14.5 %    Platelets 170 140 - 440 thou/uL    MPV 9.7 8.5 - 12.5 fL    Neutrophils % 65 50 - 70 %    Lymphocytes % 20 20 - 40 %    Monocytes % 10 2 - 10 %    Eosinophils % 4 0 - 6 %    Basophils % 1 0 - 2 %    Immature Granulocyte % 0 <=0 %    Neutrophils Absolute 6.1 2.0 - 7.7 thou/uL    Lymphocytes Absolute 1.9 0.8 - 4.4 thou/uL    Monocytes Absolute 0.9 0.0 - 0.9 thou/uL    Eosinophils Absolute 0.3 0.0 - 0.4 thou/uL    Basophils Absolute 0.1 0.0 - 0.2 thou/uL    Immature Granulocyte Absolute 0.0 <=0.0 thou/uL       RADIOLOGY:  Reviewed all pertinent imaging. Please see official radiology report.  No results found.      I have independently reviewed and interpreted the EKG(s) documented above.    Procedures      I, Dean Torres, am serving as a scribe to document services personally performed by Dr. Leopoldo Donaldson based on my observation and the provider's statements to me. I, Leopoldo Donaldson, DO attest that Dean Torres is acting in a scribe capacity, has observed my performance of the services and has documented them in accordance with my direction.    Leopoldo Donaldson D.O.  Emergency Medicine  [facility] EMERGENCY DEPARTMENT  [address]  [facility] [address]  Dept: [phone]  Loc: [phone]     Leopoldo Donaldson DO  07/01/21 1521

## 2021-07-04 NOTE — ED PROVIDER NOTES
ED Resident Attestation by Leopoldo Donaldson DO at 7/1/2021 12:14 PM     Author: Leopoldo Donaldson DO Service: Emergency Medicine Author Type: Physician    Filed: 7/1/2021 12:14 PM Date of Service: 7/1/2021 12:14 PM Status: Signed    : Leopoldo Donaldson DO (Physician)       See attending note

## 2021-07-06 VITALS — WEIGHT: 198 LBS | HEIGHT: 70 IN | BODY MASS INDEX: 28.35 KG/M2

## 2021-07-11 PROBLEM — I49.1 PREMATURE ATRIAL BEATS: Status: ACTIVE | Noted: 2019-08-02

## 2021-07-11 PROBLEM — E87.6 HYPOKALEMIA: Status: ACTIVE | Noted: 2021-07-01

## 2021-07-11 PROBLEM — I49.3 PVC'S (PREMATURE VENTRICULAR CONTRACTIONS): Status: ACTIVE | Noted: 2021-07-01

## 2021-07-16 DIAGNOSIS — E78.00 PURE HYPERCHOLESTEROLEMIA: ICD-10-CM

## 2021-07-16 DIAGNOSIS — I10 ESSENTIAL HYPERTENSION: ICD-10-CM

## 2021-07-21 NOTE — TELEPHONE ENCOUNTER
"Routing refill request to provider for review/approval because:  Labs not current:  LDL    Last Written Prescription Date:  7/21/2020  Last Fill Quantity: 90,  # refills: 3   Last office visit provider:  7/1/20/21 Dr. Salazar Agrawal     chlorthalidone (HYGROTEN) 25 MG tablet 90 tablet 3 7/21/2020  No   Sig - Route: Take 1 tablet (25 mg total) by mouth daily. - Oral   Sent to pharmacy as: chlorthalidone 25 mg tablet (HYGROTEN)   E-Prescribing Status: Receipt confirmed by pharmacy (7/21/2020 10:57 AM CDT)     atorvastatin (LIPITOR) 40 MG [id number] - Route: Take 1 tablet (40 mg total) by mouth daily. - OralSent to pharmacy as: atorvastatin 40 mg tablet (Lipitor)E-Prescribing Status: Receipt confirmed by pharmacy (7/21/2020 10:57 AM CDT)       Requested Prescriptions   Pending Prescriptions Disp Refills     chlorthalidone (HYGROTON) 25 MG tablet 90 tablet 3     Sig: Take 1 tablet (25 mg) by mouth daily       Diuretics (Including Combos) Protocol Passed - 7/16/2021  1:44 PM        Passed - Blood pressure under 140/90 in past 12 months     BP Readings from Last 3 Encounters:   07/07/21 118/80   07/01/21 134/78   07/21/20 126/88                 Passed - Recent (12 mo) or future (30 days) visit within the authorizing provider's specialty     Patient has had an office visit with the authorizing provider or a provider within the authorizing providers department within the previous 12 mos or has a future within next 30 days. See \"Patient Info\" tab in inbasket, or \"Choose Columns\" in Meds & Orders section of the refill encounter.              Passed - Medication is active on med list        Passed - Patient is age 18 or older        Passed - Normal serum creatinine on file in past 12 months     Recent Labs   Lab Test 07/01/21  0832   CR 1.05              Passed - Normal serum potassium on file in past 12 months     Recent Labs   Lab Test 07/07/21  1012   POTASSIUM 4.2                    Passed - Normal " "serum sodium on file in past 12 months     Recent Labs   Lab Test 07/01/21  0832                    atorvastatin (LIPITOR) 40 MG tablet 90 tablet 3     Sig: Take 1 tablet (40 mg) by mouth daily       Statins Protocol Failed - 7/16/2021  1:44 PM        Failed - LDL on file in past 12 months     Recent Labs   Lab Test 07/08/20  0916   LDL 79             Passed - No abnormal creatine kinase in past 12 months     No lab results found.             Passed - Recent (12 mo) or future (30 days) visit within the authorizing provider's specialty     Patient has had an office visit with the authorizing provider or a provider within the authorizing providers department within the previous 12 mos or has a future within next 30 days. See \"Patient Info\" tab in inbasket, or \"Choose Columns\" in Meds & Orders section of the refill encounter.              Passed - Medication is active on med list        Passed - Patient is age 18 or older             Winnie Simms RN 07/21/21 6:26 PM  "

## 2021-07-22 ENCOUNTER — TELEPHONE (OUTPATIENT)
Dept: FAMILY MEDICINE | Facility: CLINIC | Age: 71
End: 2021-07-22

## 2021-07-22 ENCOUNTER — OFFICE VISIT (OUTPATIENT)
Dept: FAMILY MEDICINE | Facility: CLINIC | Age: 71
End: 2021-07-22
Payer: COMMERCIAL

## 2021-07-22 VITALS
OXYGEN SATURATION: 96 % | HEIGHT: 70 IN | WEIGHT: 194 LBS | DIASTOLIC BLOOD PRESSURE: 78 MMHG | SYSTOLIC BLOOD PRESSURE: 128 MMHG | HEART RATE: 94 BPM | BODY MASS INDEX: 27.77 KG/M2

## 2021-07-22 DIAGNOSIS — M67.90 TENDON NODULE: ICD-10-CM

## 2021-07-22 DIAGNOSIS — Z00.00 ENCOUNTER FOR MEDICARE ANNUAL WELLNESS EXAM: Primary | ICD-10-CM

## 2021-07-22 DIAGNOSIS — I10 ESSENTIAL (PRIMARY) HYPERTENSION: ICD-10-CM

## 2021-07-22 DIAGNOSIS — E87.6 HYPOKALEMIA: Primary | ICD-10-CM

## 2021-07-22 DIAGNOSIS — R68.89 HEAT INTOLERANCE: ICD-10-CM

## 2021-07-22 LAB
ANION GAP SERPL CALCULATED.3IONS-SCNC: 12 MMOL/L (ref 5–18)
BUN SERPL-MCNC: 14 MG/DL (ref 8–28)
CALCIUM SERPL-MCNC: 9.9 MG/DL (ref 8.5–10.5)
CHLORIDE BLD-SCNC: 100 MMOL/L (ref 98–107)
CHOLEST SERPL-MCNC: 132 MG/DL
CO2 SERPL-SCNC: 27 MMOL/L (ref 22–31)
CREAT SERPL-MCNC: 1.08 MG/DL (ref 0.7–1.3)
FASTING STATUS PATIENT QL REPORTED: YES
GFR SERPL CREATININE-BSD FRML MDRD: 69 ML/MIN/1.73M2
GLUCOSE BLD-MCNC: 100 MG/DL (ref 70–125)
HDLC SERPL-MCNC: 42 MG/DL
LDLC SERPL CALC-MCNC: 69 MG/DL
POTASSIUM BLD-SCNC: 3.3 MMOL/L (ref 3.5–5)
SODIUM SERPL-SCNC: 139 MMOL/L (ref 136–145)
T4 FREE SERPL-MCNC: 1.05 NG/DL (ref 0.7–1.8)
TRIGL SERPL-MCNC: 106 MG/DL
TSH SERPL DL<=0.005 MIU/L-ACNC: 6.31 UIU/ML (ref 0.3–5)

## 2021-07-22 PROCEDURE — 90714 TD VACC NO PRESV 7 YRS+ IM: CPT | Performed by: FAMILY MEDICINE

## 2021-07-22 PROCEDURE — 84439 ASSAY OF FREE THYROXINE: CPT | Performed by: FAMILY MEDICINE

## 2021-07-22 PROCEDURE — 80061 LIPID PANEL: CPT | Performed by: FAMILY MEDICINE

## 2021-07-22 PROCEDURE — 84443 ASSAY THYROID STIM HORMONE: CPT | Performed by: FAMILY MEDICINE

## 2021-07-22 PROCEDURE — 99397 PER PM REEVAL EST PAT 65+ YR: CPT | Mod: 25 | Performed by: FAMILY MEDICINE

## 2021-07-22 PROCEDURE — 36415 COLL VENOUS BLD VENIPUNCTURE: CPT | Performed by: FAMILY MEDICINE

## 2021-07-22 PROCEDURE — 90471 IMMUNIZATION ADMIN: CPT | Performed by: FAMILY MEDICINE

## 2021-07-22 PROCEDURE — 80048 BASIC METABOLIC PNL TOTAL CA: CPT | Performed by: FAMILY MEDICINE

## 2021-07-22 PROCEDURE — 99213 OFFICE O/P EST LOW 20 MIN: CPT | Mod: 25 | Performed by: FAMILY MEDICINE

## 2021-07-22 RX ORDER — CHLORTHALIDONE 25 MG/1
25 TABLET ORAL DAILY
Qty: 90 TABLET | Refills: 3 | Status: SHIPPED | OUTPATIENT
Start: 2021-07-22 | End: 2022-07-11

## 2021-07-22 RX ORDER — POTASSIUM CHLORIDE 750 MG/1
10 TABLET, EXTENDED RELEASE ORAL DAILY
Qty: 90 TABLET | Refills: 3 | Status: SHIPPED | OUTPATIENT
Start: 2021-07-22 | End: 2022-07-11

## 2021-07-22 RX ORDER — ATORVASTATIN CALCIUM 40 MG/1
40 TABLET, FILM COATED ORAL DAILY
Qty: 90 TABLET | Refills: 3 | Status: SHIPPED | OUTPATIENT
Start: 2021-07-22 | End: 2022-07-11

## 2021-07-22 ASSESSMENT — ACTIVITIES OF DAILY LIVING (ADL): CURRENT_FUNCTION: NO ASSISTANCE NEEDED

## 2021-07-22 ASSESSMENT — MIFFLIN-ST. JEOR: SCORE: 1641.23

## 2021-07-22 NOTE — PROGRESS NOTES
"SUBJECTIVE:   Eduardo Collins is a 71 year old male who presents for Preventive Visit.    Discussed pt's recent ED visit and reviewed the notes. Pt is followed by cardiology now and has a stress test scheduled as well as holter monitor.     {Patient has been advised of split billing requirements and indicates understanding: Yes   Are you in the first 12 months of your Medicare coverage?  No    Healthy Habits:     In general, how would you rate your overall health?  Good    Frequency of exercise:  6-7 days/week    Duration of exercise:  45-60 minutes    Do you usually eat at least 4 servings of fruit and vegetables a day, include whole grains    & fiber and avoid regularly eating high fat or \"junk\" foods?  No    Taking medications regularly:  Yes    Medication side effects:  Not applicable    Ability to successfully perform activities of daily living:  No assistance needed    Home Safety:  No safety concerns identified    Hearing Impairment:  No hearing concerns    In the past 6 months, have you been bothered by leaking of urine?  No    In general, how would you rate your overall mental or emotional health?  Good      PHQ-2 Total Score: 0    Additional concerns today:  No    Do you feel safe in your environment? Yes    Have you ever done Advance Care Planning? (For example, a Health Directive, POLST, or a discussion with a medical provider or your loved ones about your wishes): Yes, patient states has an Advance Care Planning document and will bring a copy to the clinic.      Fall risk     click delete button to remove this line now  Cognitive Screening   1) Repeat 3 items (Leader, Season, Table)    2) Clock draw: NORMAL  3) 3 item recall: Recalls 3 objects  Results: 3 items recalled: COGNITIVE IMPAIRMENT LESS LIKELY    Mini-CogTM Copyright LACEY Gatic[employer]. Licensed by the author for use in [facility]; reprinted with permission (alyssa@.[facility]). All rights reserved.      Do you have sleep apnea, excessive " "snoring or daytime drowsiness?: no    Reviewed and updated as needed this visit by clinical staff  Tobacco  Allergies  Meds  Problems             Reviewed and updated as needed this visit by Provider   Allergies  Meds  Problems            Social History     Tobacco Use     Smoking status: Never Smoker     Smokeless tobacco: Never Used   Substance Use Topics     Alcohol use: Yes     Alcohol/week: 6.0 standard drinks     Comment: Alcoholic Drinks/day: usually has a couple of beers 2-3 x per week.         Alcohol Use 7/22/2021   Prescreen: >3 drinks/day or >7 drinks/week? No           Current providers sharing in care for this patient include:   Patient Care Team:  Chiquis Lopez MD as PCP - General (Family Medicine)  Chiquis Lopez MD as Assigned PCP    The following health maintenance items are reviewed in Epic and correct as of today:  Health Maintenance Due   Topic Date Due     AORTIC ANEURYSM SCREENING (SYSTEM ASSIGNED)  Never done     Lab work is in process      Review of Systems   Endorses: congestion, chest pain, heat intolerance  Constitutional, HEENT, cardiovascular, pulmonary, GI, , musculoskeletal, neuro, skin, endocrine and psych systems are negative, except as otherwise noted.    OBJECTIVE:   /78   Pulse 94   Ht 1.778 m (5' 10\")   Wt 88 kg (194 lb)   SpO2 96%   BMI 27.84 kg/m   Estimated body mass index is 27.84 kg/m  as calculated from the following:    Height as of this encounter: 1.778 m (5' 10\").    Weight as of this encounter: 88 kg (194 lb).  Physical Exam  Gen: Alert, NAD, appears stated age, normal hygiene   Eyes: conjunctivae without injection, sclera clear, EOMI  ENT/mouth: nares clear, septum midline, absent rhinorrhea, throat without injection, neck is supple, no thyroid enlargement  CV: RRR, no murmur appreciated, pedal edema absent bilaterally  Resp: CTAB, no wheezes, rales or ronchi  ABD: normoactive, non-tender to palpation, nondistended  MSK: grossly full " "range of motion in all joints, no obvious deformity  Neuro: CN II-XII grossly intact, no deficits in coordination  Psych: no apparent hallucinations or delusions, no pressured speech; alert, oriented x3  SKIN: dry and without lesions  Heme/lymph: no pallor, no active bleeding/bruising, bilateral submandibular adenopathy appreciated      ASSESSMENT / PLAN:     USPSTF Recommendations for age 71:  Patient has been counseled on/screened for:  - intimate partner violence and there are no concerns at this time  - a healthful diet and physical activity for CVD prevention  - Diabetes and hyperlipidemia: screening was performed.   - Hep C, negative in 2019  - Asprin use: patient chose to continue  - Abdominal Aortic Aneurysm: Patient has not smoked at least 100 cigarettes in his lifetime, ultrasonography desired by patient  Sexually transmitted infections: Patient would not like to be screened for chlamydia, gonorrhea, syphilis, HIV, and hepatitis  Colorectal Cancer: Colonoscopy last performed in 2018, due in 2023  Immunizations: up to date except for td due in August which was recommended  Fall risk assessment: Get up and go test completed today without concern        ICD-10-CM    1. Encounter for Medicare annual wellness exam  Z00.00 Basic metabolic panel  (Ca, Cl, CO2, Creat, Gluc, K, Na, BUN)     Lipid panel reflex to direct LDL Fasting     US Abdominal Aorta Imaging   2. Essential hypertension  I10 Basic metabolic panel  (Ca, Cl, CO2, Creat, Gluc, K, Na, BUN)   3. Tendon nodule  M67.90 Orthopedic  Referral   4. Essential (primary) hypertension   I10 US Abdominal Aorta Imaging       Patient has been advised of split billing requirements and indicates understanding: Yes  COUNSELING:  Reviewed preventive health counseling, as reflected in patient instructions    Estimated body mass index is 27.84 kg/m  as calculated from the following:    Height as of this encounter: 1.778 m (5' 10\").    Weight as of this " encounter: 88 kg (194 lb).      He reports that he has never smoked. He has never used smokeless tobacco.      Appropriate preventive services were discussed with this patient, including applicable screening as appropriate for cardiovascular disease, diabetes, osteopenia/osteoporosis, and glaucoma.  As appropriate for age/gender, discussed screening for colorectal cancer, prostate cancer, breast cancer, and cervical cancer. Checklist reviewing preventive services available has been given to the patient.    Reviewed patients plan of care and provided an AVS. The Basic Care Plan (routine screening as documented in Health Maintenance) for Eduardo meets the Care Plan requirement. This Care Plan has been established and reviewed with the Patient.    Counseling Resources:  ATP IV Guidelines  Pooled Cohorts Equation Calculator  Breast Cancer Risk Calculator  Breast Cancer: Medication to Reduce Risk  FRAX Risk Assessment  ICSI Preventive Guidelines  Dietary Guidelines for Americans, 2010  USDA's MyPlate  ASA Prophylaxis  Lung CA Screening    Chiquis Lopez MD  [facility]    Identified Health Risks:

## 2021-07-22 NOTE — PATIENT INSTRUCTIONS
Patient Education   Personalized Prevention Plan  You are due for the preventive services outlined below.  Your care team is available to assist you in scheduling these services.  If you have already completed any of these items, please share that information with your care team to update in your medical record.  Health Maintenance Due   Topic Date Due     AORTIC ANEURYSM SCREENING (SYSTEM ASSIGNED)  Never done       Understanding USDA MyPlate  The USDA has guidelines to help you make healthy food choices. These are called MyPlate. MyPlate shows the food groups that make up healthy meals using the image of a place setting. Before you eat, think about the healthiest choices for what to put on your plate or in your cup or bowl. To learn more about building a healthy plate, visit www.choosemyplate.gov.    The food groups    Fruits. Any fruit or 100% fruit juice counts as part of the Fruit Group. Fruits may be fresh, canned, frozen, or dried, and may be whole, cut-up, or pureed. Make 1/2 of your plate fruits and vegetables.    Vegetables. Any vegetable or 100% vegetable juice counts as a member of the Vegetable Group. Vegetables may be fresh, frozen, canned, or dried. They can be served raw or cooked and may be whole, cut-up, or mashed. Make 1/2 of your plate fruits and vegetables.    Grains. All foods made from grains are part of the Grains Group. These include wheat, rice, oats, cornmeal, and barley. Grains are often used to make foods such as bread, pasta, oatmeal, cereal, tortillas, and grits. Grains should be no more than 1/4 of your plate. At least half of your grains should be whole grains.    Protein. This group includes meat, poultry, seafood, beans and peas, eggs, processed soy products (such as tofu), nuts (including nut butters), and seeds. Make protein choices no more than 1/4 of your plate. Meat and poultry choices should be lean or low fat.    Dairy. The Dairy Group includes all fluid milk products and  foods made from milk that contain calcium, such as yogurt and cheese. (Foods that have little calcium, such as cream, butter, and cream cheese, are not part of this group.) Most dairy choices should be low-fat or fat-free.    Oils. Oils aren't a food group, but they do contain essential nutrients. However it's important to watch your intake of oils. These are fats that are liquid at room temperature. They include canola, corn, olive, soybean, vegetable, and sunflower oil. Foods that are mainly oil include mayonnaise, certain salad dressings, and soft margarines. You likely already get your daily oil allowance from the foods you eat.  Things to limit  Eating healthy also means limiting these things in your diet:       Salt (sodium). Many processed foods have a lot of sodium. To keep sodium intake down, eat fresh vegetables, meats, poultry, and seafood when possible. Purchase low-sodium, reduced-sodium, or no-salt-added food products at the store. And don't add salt to your meals at home. Instead, season them with herbs and spices such as dill, oregano, cumin, and paprika. Or try adding flavor with lemon or lime zest and juice.    Saturated fat. Saturated fats are most often found in animal products such as beef, pork, and chicken. They are often solid at room temperature, such as butter. To reduce your saturated fat intake, choose leaner cuts of meat and poultry. And try healthier cooking methods such as grilling, broiling, roasting, or baking. For a simple lower-fat swap, use plain nonfat yogurt instead of mayonnaise when making potato salad or macaroni salad.    Added sugars. These are sugars added to foods. They are in foods such as ice cream, candy, soda, fruit drinks, sports drinks, energy drinks, cookies, pastries, jams, and syrups. Cut down on added sugars by sharing sweet treats with a family member or friend. You can also choose fruit for dessert, and drink water or other unsweetened beverages.     [employer]Well  last reviewed this educational content on 6/1/2020 2000-2021 [employer]. All rights reserved. This information is not intended as a substitute for professional medical care. Always follow your healthcare professional's instructions.

## 2021-07-22 NOTE — TELEPHONE ENCOUNTER
Called pt to discuss the labs.   Recommended increasing potassium either through diet or meds. Pt will start 10 mEq potassium.   We also discussed the thyroid stimulating hormone being elevated but with normal free T4 I would not perform any additional work-up or medication management.  Patient will just recheck this next year.

## 2021-07-26 ENCOUNTER — TRANSFERRED RECORDS (OUTPATIENT)
Dept: HEALTH INFORMATION MANAGEMENT | Facility: CLINIC | Age: 71
End: 2021-07-26

## 2021-07-30 ENCOUNTER — HOSPITAL ENCOUNTER (OUTPATIENT)
Dept: CARDIOLOGY | Facility: HOSPITAL | Age: 71
End: 2021-07-30
Attending: INTERNAL MEDICINE
Payer: COMMERCIAL

## 2021-07-30 ENCOUNTER — HOSPITAL ENCOUNTER (OUTPATIENT)
Dept: ULTRASOUND IMAGING | Facility: HOSPITAL | Age: 71
End: 2021-07-30
Attending: FAMILY MEDICINE
Payer: COMMERCIAL

## 2021-07-30 ENCOUNTER — HOSPITAL ENCOUNTER (OUTPATIENT)
Dept: NUCLEAR MEDICINE | Facility: HOSPITAL | Age: 71
End: 2021-07-30
Attending: INTERNAL MEDICINE
Payer: COMMERCIAL

## 2021-07-30 DIAGNOSIS — I10 ESSENTIAL (PRIMARY) HYPERTENSION: ICD-10-CM

## 2021-07-30 DIAGNOSIS — I49.3 PVC'S (PREMATURE VENTRICULAR CONTRACTIONS): ICD-10-CM

## 2021-07-30 LAB
LVEF ECHO: NORMAL
NUC STRESS EJECTION FRACTION: 59 %
STRESS ECHO TARGET HR: 149

## 2021-07-30 PROCEDURE — 78452 HT MUSCLE IMAGE SPECT MULT: CPT | Mod: 26 | Performed by: INTERNAL MEDICINE

## 2021-07-30 PROCEDURE — A9500 TC99M SESTAMIBI: HCPCS | Performed by: INTERNAL MEDICINE

## 2021-07-30 PROCEDURE — 343N000001 HC RX 343: Performed by: INTERNAL MEDICINE

## 2021-07-30 PROCEDURE — 78452 HT MUSCLE IMAGE SPECT MULT: CPT

## 2021-07-30 PROCEDURE — 93306 TTE W/DOPPLER COMPLETE: CPT

## 2021-07-30 PROCEDURE — 93018 CV STRESS TEST I&R ONLY: CPT | Performed by: INTERNAL MEDICINE

## 2021-07-30 PROCEDURE — 93306 TTE W/DOPPLER COMPLETE: CPT | Mod: 26 | Performed by: INTERNAL MEDICINE

## 2021-07-30 PROCEDURE — 76775 US EXAM ABDO BACK WALL LIM: CPT

## 2021-07-30 RX ADMIN — Medication 8.97 MCI.: at 13:35

## 2021-07-30 RX ADMIN — Medication 30.9 MILLICURIE: at 15:45

## 2021-07-30 NOTE — PROGRESS NOTES
epsoide of chest pain 1 month ago and palpitations.  No c/o chest pain since that time.  Denies UPTON or increase fatigue.  Here for ischemic evaluation.  Liz Lgoan RN

## 2021-08-13 DIAGNOSIS — I49.3 PVC'S (PREMATURE VENTRICULAR CONTRACTIONS): Primary | ICD-10-CM

## 2021-10-28 DIAGNOSIS — N40.0 BPH (BENIGN PROSTATIC HYPERPLASIA): ICD-10-CM

## 2021-10-28 RX ORDER — TERAZOSIN 5 MG/1
5 CAPSULE ORAL AT BEDTIME
Qty: 90 CAPSULE | Refills: 3 | Status: SHIPPED | OUTPATIENT
Start: 2021-10-28 | End: 2022-10-11

## 2021-10-28 NOTE — TELEPHONE ENCOUNTER
Refill Request: terazosin (HYTRIN) 5 MG capsule    Last OV: 7/22/2021    Pending Prescriptions:                       Disp   Refills    terazosin (HYTRIN) 5 MG capsule           90 cap*3            Sig: Take 1 capsule (5 mg) by mouth At Bedtime

## 2022-10-01 ENCOUNTER — HEALTH MAINTENANCE LETTER (OUTPATIENT)
Age: 72
End: 2022-10-01

## 2022-10-09 DIAGNOSIS — E78.00 PURE HYPERCHOLESTEROLEMIA: ICD-10-CM

## 2022-10-09 DIAGNOSIS — N40.0 BPH (BENIGN PROSTATIC HYPERPLASIA): ICD-10-CM

## 2022-10-09 DIAGNOSIS — I10 ESSENTIAL HYPERTENSION: ICD-10-CM

## 2022-10-09 NOTE — TELEPHONE ENCOUNTER
"Former patient of John & has not established care with another provider.  Please assign refill request to covering provider per clinic standard process.      Routing refill request to provider for review/approval because:  Labs not current:  Cr k na    Last Written Prescription Date:  7/12/22  Last Fill Quantity: 90,  # refills: 0   Last office visit provider:  7/22/21     Requested Prescriptions   Pending Prescriptions Disp Refills     chlorthalidone (HYGROTON) 25 MG tablet [Pharmacy Med Name: CHLORTHALIDONE 25 MG TABLET] 90 tablet 0     Sig: TAKE 1 TABLET BY MOUTH EVERY DAY       Diuretics (Including Combos) Protocol Failed - 10/9/2022  7:56 AM        Failed - Blood pressure under 140/90 in past 12 months     BP Readings from Last 3 Encounters:   07/22/21 128/78   07/07/21 118/80   07/01/21 134/78                 Failed - Normal serum creatinine on file in past 12 months     Recent Labs   Lab Test 07/22/21  0953   CR 1.08              Failed - Normal serum potassium on file in past 12 months     Recent Labs   Lab Test 07/22/21  0953   POTASSIUM 3.3*                    Failed - Normal serum sodium on file in past 12 months     Recent Labs   Lab Test 07/22/21  0953                 Passed - Recent (12 mo) or future (30 days) visit within the authorizing provider's specialty     Patient has had an office visit with the authorizing provider or a provider within the authorizing providers department within the previous 12 mos or has a future within next 30 days. See \"Patient Info\" tab in inbasket, or \"Choose Columns\" in Meds & Orders section of the refill encounter.              Passed - Medication is active on med list        Passed - Patient is age 18 or older             Heena Pang, RN 10/09/22 3:17 PM  "

## 2022-10-09 NOTE — TELEPHONE ENCOUNTER
"Routing refill request to provider for review/approval because:  Former patient of [facility] & has not established care with another provider.  Please assign refill request to covering provider per clinic standard process.      Labs not current:  ldl  bp    Last Written Prescription Date:  7/12/22  Last Fill Quantity: 90,  # refills: 0   Last office visit provider:  7/22/21     Requested Prescriptions   Pending Prescriptions Disp Refills     terazosin (HYTRIN) 5 MG capsule [Pharmacy Med Name: TERAZOSIN 5 MG CAPSULE] 90 capsule 3     Sig: TAKE 1 CAPSULE (5 MG) BY MOUTH AT BEDTIME       Alpha Blockers Failed - 10/9/2022  7:56 AM        Failed - Blood pressure under 140/90 in past 12 months     BP Readings from Last 3 Encounters:   07/22/21 128/78   07/07/21 118/80   07/01/21 134/78                 Passed - Recent (12 mo) or future (30 days) visit within the authorizing provider's specialty     Patient has had an office visit with the authorizing provider or a provider within the authorizing providers department within the previous 12 mos or has a future within next 30 days. See \"Patient Info\" tab in inbasket, or \"Choose Columns\" in Meds & Orders section of the refill encounter.              Passed - Patient does not have Tadalafil, Vardenafil, or Sildenafil on their medication list        Passed - Medication is active on med list        Passed - Patient is 18 years of age or older       Antiadrenergic Antihypertensives Failed - 10/9/2022  7:56 AM        Failed - Blood pressure less than 140/90 in past 6 months     BP Readings from Last 3 Encounters:   07/22/21 128/78   07/07/21 118/80   07/01/21 134/78                 Failed - Normal serum creatinine on file in past 12 months     Recent Labs   Lab Test 07/22/21  0953   CR 1.08       Ok to refill medication if creatinine is low          Passed - Medication is active on med list        Passed - Patient is age 18 or older        Passed - Recent (6 mo) or future (30 days) " "visit within the authorizing provider's specialty     Patient had office visit in the last 6 months or has a visit in the next 30 days with authorizing provider or within the authorizing provider's specialty.  See \"Patient Info\" tab in inbasket, or \"Choose Columns\" in Meds & Orders section of the refill encounter.               atorvastatin (LIPITOR) 40 MG tablet [Pharmacy Med Name: ATORVASTATIN 40 MG TABLET] 90 tablet 0     Sig: TAKE 1 TABLET BY MOUTH EVERY DAY       Statins Protocol Failed - 10/9/2022  7:56 AM        Failed - LDL on file in past 12 months     Recent Labs   Lab Test 07/22/21  0953   LDL 69             Passed - No abnormal creatine kinase in past 12 months     No lab results found.             Passed - Recent (12 mo) or future (30 days) visit within the authorizing provider's specialty     Patient has had an office visit with the authorizing provider or a provider within the authorizing providers department within the previous 12 mos or has a future within next 30 days. See \"Patient Info\" tab in inbasket, or \"Choose Columns\" in Meds & Orders section of the refill encounter.              Passed - Medication is active on med list        Passed - Patient is age 18 or older             Heena Pang, RN 10/09/22 3:16 PM  "

## 2022-10-11 RX ORDER — ATORVASTATIN CALCIUM 40 MG/1
TABLET, FILM COATED ORAL
Qty: 90 TABLET | Refills: 0 | Status: SHIPPED | OUTPATIENT
Start: 2022-10-11 | End: 2022-11-03

## 2022-10-11 RX ORDER — CHLORTHALIDONE 25 MG/1
TABLET ORAL
Qty: 90 TABLET | Refills: 0 | Status: SHIPPED | OUTPATIENT
Start: 2022-10-11 | End: 2022-11-03

## 2022-10-11 RX ORDER — TERAZOSIN 5 MG/1
5 CAPSULE ORAL AT BEDTIME
Qty: 90 CAPSULE | Refills: 0 | Status: SHIPPED | OUTPATIENT
Start: 2022-10-11 | End: 2022-11-03

## 2022-11-02 ASSESSMENT — ENCOUNTER SYMPTOMS
HEMATOCHEZIA: 0
CHILLS: 0
NERVOUS/ANXIOUS: 0
COUGH: 1
HEADACHES: 0
CONSTIPATION: 0
EYE PAIN: 0
ABDOMINAL PAIN: 0
DYSURIA: 0
DIZZINESS: 0
NAUSEA: 0
WEAKNESS: 0
HEMATURIA: 0
PARESTHESIAS: 0
SHORTNESS OF BREATH: 0
FEVER: 0
PALPITATIONS: 0
ARTHRALGIAS: 1
SORE THROAT: 0
HEARTBURN: 0
FREQUENCY: 0
DIARRHEA: 0
JOINT SWELLING: 0
MYALGIAS: 0

## 2022-11-02 ASSESSMENT — ACTIVITIES OF DAILY LIVING (ADL): CURRENT_FUNCTION: NO ASSISTANCE NEEDED

## 2022-11-03 ENCOUNTER — OFFICE VISIT (OUTPATIENT)
Dept: FAMILY MEDICINE | Facility: CLINIC | Age: 72
End: 2022-11-03
Payer: COMMERCIAL

## 2022-11-03 VITALS
HEIGHT: 69 IN | WEIGHT: 191 LBS | RESPIRATION RATE: 20 BRPM | HEART RATE: 85 BPM | SYSTOLIC BLOOD PRESSURE: 120 MMHG | BODY MASS INDEX: 28.29 KG/M2 | DIASTOLIC BLOOD PRESSURE: 76 MMHG | OXYGEN SATURATION: 97 %

## 2022-11-03 DIAGNOSIS — E87.6 HYPOKALEMIA: ICD-10-CM

## 2022-11-03 DIAGNOSIS — E66.3 OVERWEIGHT: ICD-10-CM

## 2022-11-03 DIAGNOSIS — E55.9 VITAMIN D DEFICIENCY: ICD-10-CM

## 2022-11-03 DIAGNOSIS — M76.62 LEFT ACHILLES TENDINITIS: ICD-10-CM

## 2022-11-03 DIAGNOSIS — E78.00 PURE HYPERCHOLESTEROLEMIA: ICD-10-CM

## 2022-11-03 DIAGNOSIS — N40.1 BENIGN PROSTATIC HYPERPLASIA WITH LOWER URINARY TRACT SYMPTOMS, SYMPTOM DETAILS UNSPECIFIED: ICD-10-CM

## 2022-11-03 DIAGNOSIS — Z00.00 ENCOUNTER FOR MEDICARE ANNUAL WELLNESS EXAM: Primary | ICD-10-CM

## 2022-11-03 DIAGNOSIS — K40.90 LEFT INGUINAL HERNIA: ICD-10-CM

## 2022-11-03 DIAGNOSIS — I10 ESSENTIAL HYPERTENSION: Chronic | ICD-10-CM

## 2022-11-03 DIAGNOSIS — E03.9 HYPOTHYROIDISM, UNSPECIFIED TYPE: ICD-10-CM

## 2022-11-03 LAB
ALBUMIN SERPL BCG-MCNC: 4.4 G/DL (ref 3.5–5.2)
ALP SERPL-CCNC: 92 U/L (ref 40–129)
ALT SERPL W P-5'-P-CCNC: 46 U/L (ref 10–50)
ANION GAP SERPL CALCULATED.3IONS-SCNC: 13 MMOL/L (ref 7–15)
AST SERPL W P-5'-P-CCNC: 48 U/L (ref 10–50)
BILIRUB SERPL-MCNC: 1.1 MG/DL
BUN SERPL-MCNC: 19.3 MG/DL (ref 8–23)
CALCIUM SERPL-MCNC: 10.2 MG/DL (ref 8.8–10.2)
CHLORIDE SERPL-SCNC: 99 MMOL/L (ref 98–107)
CHOLEST SERPL-MCNC: 133 MG/DL
CREAT SERPL-MCNC: 1.12 MG/DL (ref 0.67–1.17)
DEPRECATED HCO3 PLAS-SCNC: 27 MMOL/L (ref 22–29)
GFR SERPL CREATININE-BSD FRML MDRD: 70 ML/MIN/1.73M2
GLUCOSE SERPL-MCNC: 117 MG/DL (ref 70–99)
HDLC SERPL-MCNC: 45 MG/DL
LDLC SERPL CALC-MCNC: 73 MG/DL
NONHDLC SERPL-MCNC: 88 MG/DL
POTASSIUM SERPL-SCNC: 3.8 MMOL/L (ref 3.4–5.3)
PROT SERPL-MCNC: 7.6 G/DL (ref 6.4–8.3)
SODIUM SERPL-SCNC: 139 MMOL/L (ref 136–145)
TRIGL SERPL-MCNC: 77 MG/DL
TSH SERPL DL<=0.005 MIU/L-ACNC: 4.52 UIU/ML (ref 0.3–4.2)

## 2022-11-03 PROCEDURE — 80053 COMPREHEN METABOLIC PANEL: CPT | Performed by: FAMILY MEDICINE

## 2022-11-03 PROCEDURE — G0439 PPPS, SUBSEQ VISIT: HCPCS | Performed by: FAMILY MEDICINE

## 2022-11-03 PROCEDURE — 84443 ASSAY THYROID STIM HORMONE: CPT | Performed by: FAMILY MEDICINE

## 2022-11-03 PROCEDURE — 82306 VITAMIN D 25 HYDROXY: CPT | Performed by: FAMILY MEDICINE

## 2022-11-03 PROCEDURE — 80061 LIPID PANEL: CPT | Performed by: FAMILY MEDICINE

## 2022-11-03 PROCEDURE — 99214 OFFICE O/P EST MOD 30 MIN: CPT | Mod: 25 | Performed by: FAMILY MEDICINE

## 2022-11-03 PROCEDURE — 36415 COLL VENOUS BLD VENIPUNCTURE: CPT | Performed by: FAMILY MEDICINE

## 2022-11-03 RX ORDER — POTASSIUM CHLORIDE 750 MG/1
10 TABLET, EXTENDED RELEASE ORAL DAILY
Qty: 90 TABLET | Refills: 3 | Status: SHIPPED | OUTPATIENT
Start: 2022-11-03 | End: 2023-10-30

## 2022-11-03 RX ORDER — ATORVASTATIN CALCIUM 40 MG/1
40 TABLET, FILM COATED ORAL DAILY
Qty: 90 TABLET | Refills: 3 | Status: SHIPPED | OUTPATIENT
Start: 2022-11-03 | End: 2023-11-08

## 2022-11-03 RX ORDER — CHLORTHALIDONE 25 MG/1
25 TABLET ORAL DAILY
Qty: 90 TABLET | Refills: 3 | Status: SHIPPED | OUTPATIENT
Start: 2022-11-03 | End: 2023-11-08

## 2022-11-03 RX ORDER — TERAZOSIN 5 MG/1
5 CAPSULE ORAL AT BEDTIME
Qty: 90 CAPSULE | Refills: 3 | Status: SHIPPED | OUTPATIENT
Start: 2022-11-03 | End: 2023-05-04

## 2022-11-03 ASSESSMENT — ENCOUNTER SYMPTOMS
JOINT SWELLING: 0
FREQUENCY: 0
HEMATOCHEZIA: 0
CHILLS: 0
HEADACHES: 0
NERVOUS/ANXIOUS: 0
MYALGIAS: 0
ABDOMINAL PAIN: 0
WEAKNESS: 0
DIARRHEA: 0
SHORTNESS OF BREATH: 0
HEMATURIA: 0
FEVER: 0
ARTHRALGIAS: 1
PARESTHESIAS: 0
PALPITATIONS: 0
COUGH: 1
DYSURIA: 0
DIZZINESS: 0
NAUSEA: 0
EYE PAIN: 0
SORE THROAT: 0
HEARTBURN: 0
CONSTIPATION: 0

## 2022-11-03 ASSESSMENT — ACTIVITIES OF DAILY LIVING (ADL): CURRENT_FUNCTION: NO ASSISTANCE NEEDED

## 2022-11-03 ASSESSMENT — PAIN SCALES - GENERAL: PAINLEVEL: NO PAIN (0)

## 2022-11-03 NOTE — PATIENT INSTRUCTIONS
Patient Education   Personalized Prevention Plan  You are due for the preventive services outlined below.  Your care team is available to assist you in scheduling these services.  If you have already completed any of these items, please share that information with your care team to update in your medical record.  Health Maintenance Due   Topic Date Due     Hepatitis B Vaccine (1 of 3 - 3-dose series) Never done       Understanding USDA MyPlate  The USDA has guidelines to help you make healthy food choices. These are called MyPlate. MyPlate shows the food groups that make up healthy meals using the image of a place setting. Before you eat, think about the healthiest choices for what to put on your plate or in your cup or bowl. To learn more about building a healthy plate, visit www.choosemyplate.gov.    The food groups    Fruits. Any fruit or 100% fruit juice counts as part of the Fruit Group. Fruits may be fresh, canned, frozen, or dried, and may be whole, cut-up, or pureed. Make 1/2 of your plate fruits and vegetables.    Vegetables. Any vegetable or 100% vegetable juice counts as a member of the Vegetable Group. Vegetables may be fresh, frozen, canned, or dried. They can be served raw or cooked and may be whole, cut-up, or mashed. Make 1/2 of your plate fruits and vegetables.    Grains. All foods made from grains are part of the Grains Group. These include wheat, rice, oats, cornmeal, and barley. Grains are often used to make foods such as bread, pasta, oatmeal, cereal, tortillas, and grits. Grains should be no more than 1/4 of your plate. At least half of your grains should be whole grains.    Protein. This group includes meat, poultry, seafood, beans and peas, eggs, processed soy products (such as tofu), nuts (including nut butters), and seeds. Make protein choices no more than 1/4 of your plate. Meat and poultry choices should be lean or low fat.    Dairy. The Dairy Group includes all fluid milk products and  foods made from milk that contain calcium, such as yogurt and cheese. (Foods that have little calcium, such as cream, butter, and cream cheese, are not part of this group.) Most dairy choices should be low-fat or fat-free.    Oils. Oils aren't a food group, but they do contain essential nutrients. However it's important to watch your intake of oils. These are fats that are liquid at room temperature. They include canola, corn, olive, soybean, vegetable, and sunflower oil. Foods that are mainly oil include mayonnaise, certain salad dressings, and soft margarines. You likely already get your daily oil allowance from the foods you eat.  Things to limit  Eating healthy also means limiting these things in your diet:       Salt (sodium). Many processed foods have a lot of sodium. To keep sodium intake down, eat fresh vegetables, meats, poultry, and seafood when possible. Purchase low-sodium, reduced-sodium, or no-salt-added food products at the store. And don't add salt to your meals at home. Instead, season them with herbs and spices such as dill, oregano, cumin, and paprika. Or try adding flavor with lemon or lime zest and juice.    Saturated fat. Saturated fats are most often found in animal products such as beef, pork, and chicken. They are often solid at room temperature, such as butter. To reduce your saturated fat intake, choose leaner cuts of meat and poultry. And try healthier cooking methods such as grilling, broiling, roasting, or baking. For a simple lower-fat swap, use plain nonfat yogurt instead of mayonnaise when making potato salad or macaroni salad.    Added sugars. These are sugars added to foods. They are in foods such as ice cream, candy, soda, fruit drinks, sports drinks, energy drinks, cookies, pastries, jams, and syrups. Cut down on added sugars by sharing sweet treats with a family member or friend. You can also choose fruit for dessert, and drink water or other unsweetened beverages.     [employer]Well  last reviewed this educational content on 6/1/2020 2000-2021 [employer]. All rights reserved. This information is not intended as a substitute for professional medical care. Always follow your healthcare professional's instructions.          Signs of Hearing Loss      Hearing much better with one ear can be a sign of hearing loss.   Hearing loss is a problem shared by many people. In fact, it is one of the most common health problems, particularly as people age. Most people age 65 and older have some hearing loss. By age 80, almost everyone does. Hearing loss often occurs slowly over the years. So you may not realize your hearing has gotten worse.  Have your hearing checked  Call your healthcare provider if you:    Have to strain to hear normal conversation    Have to watch other people s faces very carefully to follow what they re saying    Need to ask people to repeat what they ve said    Often misunderstand what people are saying    Turn the volume of the television or radio up so high that others complain    Feel that people are mumbling when they re talking to you    Find that the effort to hear leaves you feeling tired and irritated    Notice, when using the phone, that you hear better with one ear than the other  [employer] last reviewed this educational content on 1/1/2020 2000-2021 [employer]. All rights reserved. This information is not intended as a substitute for professional medical care. Always follow your healthcare professional's instructions.

## 2022-11-03 NOTE — PROGRESS NOTES
SUBJECTIVE:     Jose is a 72 year old who presents for Preventive Visit.      Annual wellness visit completed.  Risk questionnaire reviewed.  Suboptimal diet and hearing loss with high-frequency described.  Underlying hypertension.  Hydrochlorothiazide 25 mg daily terazosin 5 mg at bedtime with benefit.  Using potassium supplement with 10 mEq daily dose.  BPH history was described as very large in the past on exam.  Terazosin helps with decreasing urinary frequency or nocturia concerns including hesitancy or postvoid dribbling.  Has had elevated cholesterol historically and remains on atorvastatin 40 mg daily.  Uses vitamin D supplement 5000 units once a week during the summer and otherwise daily in the winter.  History of hypothyroidism with prior TSH elevation at 6.31.  Review of systems suggest euthyroid.  Not aware of any concerns for hernias historically.  Left Achilles tendon has been sore recently and doing more ladder climbing while getting roof repaired recently.  Patient understands need for weight loss.  Comprehensive review of systems as above otherwise all negative.       - Yas x   No children  Tobacco:  none  EtOH:  < 1 per day (beer and make cranberry wine)  Mom -  94 multiple myeloma  Dad -  77 fall with cerebral hemorrhage (lived another 10 years..) but  of CVA  1 older bro -  CHF  2 sis - still living  Surgeries:  right 5th finger ORIF; appy; bilateral 1st MTP joint calcification  Hospitalizations:  dizziness (r/o CVA)  Retired x 10 years -  ([employer], etc.)  Hobbies:  build things, , camping, fishing, hunting, hiking, canoeing, outdoor activities in the yard, etc.      Patient has been advised of split billing requirements and indicates understanding: Yes  Are you in the first 12 months of your Medicare coverage?  No    Healthy Habits:     In general, how would you rate your overall health?  Good    Frequency of exercise:  6-7  "days/week    Duration of exercise:  30-45 minutes    Do you usually eat at least 4 servings of fruit and vegetables a day, include whole grains    & fiber and avoid regularly eating high fat or \"junk\" foods?  No    Taking medications regularly:  Yes    Medication side effects:  Other    Ability to successfully perform activities of daily living:  No assistance needed    Home Safety:  No safety concerns identified    Hearing Impairment:  Need to ask people to speak up or repeat themselves    In the past 6 months, have you been bothered by leaking of urine?  No    In general, how would you rate your overall mental or emotional health?  Good      PHQ-2 Total Score: 0    Do you feel safe in your environment? Yes    Have you ever done Advance Care Planning? (For example, a Health Directive, POLST, or a discussion with a medical provider or your loved ones about your wishes): Yes, advance care planning is on file.       Fall risk  Fallen 2 or more times in the past year?: No  Any fall with injury in the past year?: No    Cognitive Screening   1) Repeat 3 items (Leader, Season, Table)    2) Clock draw: NORMAL  3) 3 item recall: Recalls 3 objects  Results: 3 items recalled: COGNITIVE IMPAIRMENT LESS LIKELY    Mini-CogTM Copyright S Gavi. Licensed by the author for use in [facility]; reprinted with permission (alyssa@[facility]). All rights reserved.      Do you have sleep apnea, excessive snoring or daytime drowsiness?: no    Reviewed and updated as needed this visit by clinical staff   Tobacco  Allergies  Meds  Problems  Med Hx  Surg Hx  Fam Hx          Reviewed and updated as needed this visit by Provider   Tobacco  Allergies  Meds  Problems  Med Hx  Surg Hx  Fam Hx         Social History     Tobacco Use     Smoking status: Never     Smokeless tobacco: Never   Substance Use Topics     Alcohol use: Yes     Alcohol/week: 5.0 standard drinks     Types: 5 Cans of beer per week     Comment: usually has " a couple of beers 2-3 x per week     If you drink alcohol do you typically have >3 drinks per day or >7 drinks per week? No    Alcohol Use 11/3/2022   Prescreen: >3 drinks/day or >7 drinks/week? -   Prescreen: >3 drinks/day or >7 drinks/week? No               Current providers sharing in care for this patient include:   Patient Care Team:  Dorian Figueroa MD as PCP - Chiquis Wakefield MD as Assigned PCP    The following health maintenance items are reviewed in Epic and correct as of today:  Health Maintenance   Topic Date Due     HEPATITIS B IMMUNIZATION (1 of 3 - 3-dose series) Never done     MEDICARE ANNUAL WELLNESS VISIT  11/03/2023     ANNUAL REVIEW OF HM ORDERS  11/03/2023     FALL RISK ASSESSMENT  11/03/2023     LIPID  07/22/2026     ADVANCE CARE PLANNING  11/03/2027     COLORECTAL CANCER SCREENING  07/25/2028     DTAP/TDAP/TD IMMUNIZATION (3 - Td or Tdap) 07/22/2031     HEPATITIS C SCREENING  Completed     PHQ-2 (once per calendar year)  Completed     INFLUENZA VACCINE  Completed     Pneumococcal Vaccine: 65+ Years  Completed     ZOSTER IMMUNIZATION  Completed     AORTIC ANEURYSM SCREENING (SYSTEM ASSIGNED)  Completed     COVID-19 Vaccine  Completed     IPV IMMUNIZATION  Aged Out     MENINGITIS IMMUNIZATION  Aged Out     Lab work is in process  Labs reviewed in EPIC  BP Readings from Last 3 Encounters:   11/03/22 120/76   07/22/21 128/78   07/07/21 118/80    Wt Readings from Last 3 Encounters:   11/03/22 86.6 kg (191 lb)   07/22/21 88 kg (194 lb)   07/07/21 89.4 kg (197 lb)                  Patient Active Problem List   Diagnosis     Hypercholesterolemia     Essential hypertension     Hypokalemia     PVC's (premature ventricular contractions)     Premature atrial beats     Left inguinal hernia     Hypothyroidism, unspecified type     Vitamin D deficiency     Past Surgical History:   Procedure Laterality Date     APPENDECTOMY  1995     TOE SURGERY Bilateral     Had bilateral great toe surgery in  past       Social History     Tobacco Use     Smoking status: Never     Smokeless tobacco: Never   Substance Use Topics     Alcohol use: Yes     Alcohol/week: 5.0 standard drinks     Types: 5 Cans of beer per week     Comment: usually has a couple of beers 2-3 x per week     Family History   Problem Relation Age of Onset     Cancer Mother         skin and multiple myeloma, latter was fatal     Cerebrovascular Disease Father         brain hemorrhage at 67 and stroke that caused dysphagia at 77     Hypertension Sister      Hypertension Sister      Hypertension Brother      Heart Failure Brother      Coronary Stenting Brother      ICD - Implantable Cardioverter Defibrillator Brother         care in [place] and [facility]     Acute Myocardial Infarction Brother          Current Outpatient Medications   Medication Sig Dispense Refill     atorvastatin (LIPITOR) 40 MG tablet Take 1 tablet (40 mg) by mouth daily 90 tablet 3     chlorthalidone (HYGROTON) 25 MG tablet Take 1 tablet (25 mg) by mouth daily 90 tablet 3     cholecalciferol, vitamin D3, 125 mcg (5,000 unit) TbDL [CHOLECALCIFEROL, VITAMIN D3, 125 MCG (5,000 UNIT) TBDL] Take 5,000 Units by mouth once a week. Takes on Mondays  Taking one a week during summer and then daily in the winter       potassium chloride ER (K-TAB/KLOR-CON) 10 MEQ CR tablet Take 1 tablet (10 mEq) by mouth daily 90 tablet 3     terazosin (HYTRIN) 5 MG capsule Take 1 capsule (5 mg) by mouth At Bedtime 90 capsule 3     tetrahydrozoline-zinc (VISINE-AC) 0.05-0.25 % ophthalmic solution [TETRAHYDROZOLINE-ZINC (VISINE-AC) 0.05-0.25 % OPHTHALMIC SOLUTION] Administer to both eyes 4 (four) times a day as needed.       triamcinolone (NASACORT) 55 mcg nasal inhaler [TRIAMCINOLONE (NASACORT) 55 MCG NASAL INHALER] Apply 1-2 sprays into each nostril 2 (two) times a day as needed.       Allergies   Allergen Reactions     Hay Fever And Allergy Relief [Allergy Relief] Unknown       Immunizations  "UTD      Review of Systems   Constitutional: Negative for chills and fever.   HENT: Negative for congestion, ear pain, hearing loss and sore throat.    Eyes: Negative for pain and visual disturbance.   Respiratory: Positive for cough. Negative for shortness of breath.    Cardiovascular: Negative for chest pain, palpitations and peripheral edema.   Gastrointestinal: Negative for abdominal pain, constipation, diarrhea, heartburn, hematochezia and nausea.   Genitourinary: Positive for impotence. Negative for dysuria, frequency, genital sores, hematuria, penile discharge and urgency.   Musculoskeletal: Positive for arthralgias. Negative for joint swelling and myalgias.   Skin: Negative for rash.   Neurological: Negative for dizziness, weakness, headaches and paresthesias.   Psychiatric/Behavioral: Negative for mood changes. The patient is not nervous/anxious.      Constitutional, HEENT, cardiovascular, pulmonary, GI, , musculoskeletal, neuro, skin, endocrine and psych systems are negative, except as otherwise noted.    OBJECTIVE:   /76 (BP Location: Left arm, Patient Position: Sitting, Cuff Size: Adult Large)   Pulse 85   Resp 20   Ht 1.74 m (5' 8.5\")   Wt 86.6 kg (191 lb)   SpO2 97%   BMI 28.62 kg/m   Estimated body mass index is 28.62 kg/m  as calculated from the following:    Height as of this encounter: 1.74 m (5' 8.5\").    Weight as of this encounter: 86.6 kg (191 lb).  Physical Exam  GENERAL: healthy, alert and no distress  EYES: Eyes grossly normal to inspection, PERRL and conjunctivae and sclerae normal  HENT: ear canals and TM's normal, nose and mouth without ulcers or lesions  NECK: no adenopathy, no asymmetry, masses, or scars and thyroid normal to palpation  RESP: lungs clear to auscultation - no rales, rhonchi or wheezes  CV: regular rate and rhythm, normal S1 S2, no S3 or S4, no murmur, click or rub, no peripheral edema and peripheral pulses strong  ABDOMEN: soft, nontender, no " hepatosplenomegaly, no masses and bowel sounds normal   (male): normal male genitalia without lesions or urethral discharge, no hernia  RECTAL: normal sphincter tone, no rectal masses, prostate normal size, smooth, nontender without nodules or masses  MS: no gross musculoskeletal defects noted, no edema  SKIN: no suspicious lesions or rashes  NEURO: Normal strength and tone, mentation intact and speech normal  PSYCH: mentation appears normal, affect normal/bright    Diagnostic Test Results:  Labs reviewed in Epic  No results found for this or any previous visit (from the past 24 hour(s)).    ASSESSMENT / PLAN:     Encounter for Medicare annual wellness exam  Annual wellness visit completed.  Risk questionnaire reviewed.  Suboptimal diet and hearing loss described consistent with sensorineural etiology.  Annual wellness visits to continue.    Essential hypertension  Blood pressure at goal.  Continues hydrochlorothiazide 25 mg daily as well as benefits of terazosin 5 mg at bedtime.  Med monitoring completed.  - Comprehensive metabolic panel  - chlorthalidone (HYGROTON) 25 MG tablet  Dispense: 90 tablet; Refill: 3    Benign prostatic hyperplasia with lower urinary tract symptoms, symptom details unspecified  Very large prostate on digital rectal exam symmetric otherwise without induration or nodularity.  No bogginess.  Benefits of terazosin described and will continue at current dosing.  - terazosin (HYTRIN) 5 MG capsule  Dispense: 90 capsule; Refill: 3    Pure hypercholesterolemia  Check lipid cascade today while fasting.  Atorvastatin 40 mg daily continuing.  - Lipid panel reflex to direct LDL Fasting  - atorvastatin (LIPITOR) 40 MG tablet  Dispense: 90 tablet; Refill: 3    Vitamin D deficiency  Vitamin D deficiency.  Uses 5000 units a week in the summer and otherwise 5000 units daily during the winter.  Ensure adequate replacement.  - Vitamin D Deficiency    Hypothyroidism, unspecified type  Prior TSH mildly  "elevated at 6.31 we will update TSH today to ensure desired improvement otherwise consider low-dose thyroid replacement.  - TSH    Hypokalemia  Has had mild hypokalemia in the past with prior potassium at 3.3 and is continuing potassium chloride 10 mEq daily.  Tolerating well.  - Comprehensive metabolic panel  - potassium chloride ER (K-TAB/KLOR-CON) 10 MEQ CR tablet  Dispense: 90 tablet; Refill: 3    Overweight  Agrees to weight goal less than 185 pounds initially, less than 180 pounds ideally.    Left inguinal hernia  Small reducible left inguinal hernia on exam today.  Asymptomatic.  Will monitor.    Left Achilles tendinitis  Left Achilles tendinitis, mild.  He avoid exacerbating triggers.  Gentle Achilles tendon stretching activities reviewed.         Patient has been advised of split billing requirements and indicates understanding: No      COUNSELING:  Reviewed preventive health counseling, as reflected in patient instructions       Regular exercise       Healthy diet/nutrition       Vision screening       Hearing screening       Dental care       Bladder control       Fall risk prevention       Aspirin prophylaxis        Colon cancer screening       Prostate cancer screening    Estimated body mass index is 28.62 kg/m  as calculated from the following:    Height as of this encounter: 1.74 m (5' 8.5\").    Weight as of this encounter: 86.6 kg (191 lb).        He reports that he has never smoked. He has never used smokeless tobacco.      Appropriate preventive services were discussed with this patient, including applicable screening as appropriate for cardiovascular disease, diabetes, osteopenia/osteoporosis, and glaucoma.  As appropriate for age/gender, discussed screening for colorectal cancer, prostate cancer, breast cancer, and cervical cancer. Checklist reviewing preventive services available has been given to the patient.    Reviewed patients plan of care and provided an AVS. The Intermediate Care Plan ( " asthma action plan, low back pain action plan, and migraine action plan) for Eduardo meets the Care Plan requirement. This Care Plan has been established and reviewed with the Patient.    Counseling Resources:  ATP IV Guidelines  Pooled Cohorts Equation Calculator  Breast Cancer Risk Calculator  Breast Cancer: Medication to Reduce Risk  FRAX Risk Assessment  ICSI Preventive Guidelines  Dietary Guidelines for Americans, 2010  [employer]'s MyPlate  ASA Prophylaxis  Lung CA Screening    Dorian Figueroa MD  [facility] [place]    Identified Health Risks:    The patient was counseled and encouraged to consider modifying their diet and eating habits. He was provided with information on recommended healthy diet options.  The patient was provided with written information regarding signs of hearing loss.

## 2022-11-04 LAB — DEPRECATED CALCIDIOL+CALCIFEROL SERPL-MC: 43 UG/L (ref 20–75)

## 2022-12-15 ENCOUNTER — OFFICE VISIT (OUTPATIENT)
Dept: FAMILY MEDICINE | Facility: CLINIC | Age: 72
End: 2022-12-15
Payer: COMMERCIAL

## 2022-12-15 VITALS
RESPIRATION RATE: 18 BRPM | BODY MASS INDEX: 29.02 KG/M2 | WEIGHT: 193.7 LBS | DIASTOLIC BLOOD PRESSURE: 95 MMHG | HEART RATE: 118 BPM | TEMPERATURE: 98.7 F | OXYGEN SATURATION: 94 % | SYSTOLIC BLOOD PRESSURE: 139 MMHG

## 2022-12-15 DIAGNOSIS — J20.9 ACUTE BRONCHITIS, UNSPECIFIED ORGANISM: Primary | ICD-10-CM

## 2022-12-15 PROCEDURE — 99213 OFFICE O/P EST LOW 20 MIN: CPT | Performed by: PHYSICIAN ASSISTANT

## 2022-12-15 RX ORDER — AZITHROMYCIN 250 MG/1
TABLET, FILM COATED ORAL
Qty: 6 TABLET | Refills: 0 | Status: SHIPPED | OUTPATIENT
Start: 2022-12-15 | End: 2022-12-20

## 2022-12-15 NOTE — PROGRESS NOTES
URGENT CARE VISIT:    SUBJECTIVE:  Eduardo Collins is a 72 year old male who presents with a productive cough for 4 day(s). He states he initially became sick about 3 weeks ago with fever, sore throat and cough, symptoms improved after a week or two but started to recur about 4 days ago. He reports post nasal drainage and cough with the chest congestion as well. Symptoms are moderate and worsening. He denies fever and ear pain. He has tried OTC cough suppressants and dayquil and nyquil with some relief of symptoms. Humidifier and hot shower helps. Symptoms are exacerbated by lying down change in temperature triggers. Recent sick contacts include none. He denies a history of asthma. Patient denies history of smoking.      Volunteers teaching since education in schools.     PMH:   Past Medical History:   Diagnosis Date     Arthritis      Depression 01/01/1975    Controlled per patient, on medication     Essential hypertension 01/01/1988     Hayfever     Seasonal per patient     History of prostatitis      Hyperlipemia 01/01/1988    atorvastatin     Localized primary osteoarthrosis of carpometacarpal joint of right wrist 07/15/2015     Premature atrial beats 08/02/2019     Allergies: [unfilled]  Medications:   Current Outpatient Medications   Medication Sig Dispense Refill     atorvastatin (LIPITOR) 40 MG tablet Take 1 tablet (40 mg) by mouth daily 90 tablet 3     chlorthalidone (HYGROTON) 25 MG tablet Take 1 tablet (25 mg) by mouth daily 90 tablet 3     cholecalciferol, vitamin D3, 125 mcg (5,000 unit) TbDL [CHOLECALCIFEROL, VITAMIN D3, 125 MCG (5,000 UNIT) TBDL] Take 5,000 Units by mouth once a week. Takes on Mondays  Taking one a week during summer and then daily in the winter       potassium chloride ER (K-TAB/KLOR-CON) 10 MEQ CR tablet Take 1 tablet (10 mEq) by mouth daily 90 tablet 3     terazosin (HYTRIN) 5 MG capsule Take 1 capsule (5 mg) by mouth At Bedtime 90 capsule 3     tetrahydrozoline-zinc (VISINE-AC)  0.05-0.25 % ophthalmic solution [TETRAHYDROZOLINE-ZINC (VISINE-AC) 0.05-0.25 % OPHTHALMIC SOLUTION] Administer to both eyes 4 (four) times a day as needed.       triamcinolone (NASACORT) 55 mcg nasal inhaler [TRIAMCINOLONE (NASACORT) 55 MCG NASAL INHALER] Apply 1-2 sprays into each nostril 2 (two) times a day as needed.       Social History:   Social History     Socioeconomic History     Marital status:      Spouse name: Not on file     Number of children: 0     Years of education: Not on file     Highest education level: Not on file   Occupational History     Not on file   Tobacco Use     Smoking status: Never     Smokeless tobacco: Never   Substance and Sexual Activity     Alcohol use: Yes     Alcohol/week: 5.0 standard drinks     Types: 5 Cans of beer per week     Comment: usually has a couple of beers 2-3 x per week     Drug use: No     Sexual activity: Yes     Partners: Female   Other Topics Concern     Not on file   Social History Narrative    Takes care of his and his neighbors [address] lots in [place]. He and his wife walk for 20-25 minutes each evening.     Social Determinants of Health     Financial Resource Strain: Not on file   Food Insecurity: Not on file   Transportation Needs: Not on file   Physical Activity: Not on file   Stress: Not on file   Social Connections: Not on file   Intimate Partner Violence: Not on file   Housing Stability: Not on file       ROS: ROS otherwise found to be negative except as noted above.    OBJECTIVE:  BP (!) 139/95 (BP Location: Right arm, Patient Position: Sitting, Cuff Size: Adult Regular)   Pulse 118   Temp 98.7  F (37.1  C) (Oral)   Resp 18   Wt 87.9 kg (193 lb 11.2 oz)   SpO2 94%   BMI 29.02 kg/m    General: WDWN in NAD.   Eyes: Non-injected conjunctivas without drainage bilaterally.  Nose: Clear rhinorrhea.  Ears: Bilateral TMs without erythema, injection, or effusion. No erythema or edema of external canals.   Oropharynx: Mildly erythematous  oropharynx. No posterior pharyngeal erythema or exudate.  No oral lesions.  Uvula is midline without erythema or edema.  Cardiac: RRR without murmurs, rubs, or gallops.  Respiratory: LCTAB without adventitious sounds. Non-labored breathing.  Lymph nodes: no cervical adenopathy.    ASSESSMENT:    ICD-10-CM    1. Acute bronchitis, unspecified organism  J20.9 azithromycin (ZITHROMAX) 250 MG tablet           PLAN:  See patient instructions Patient verbalized understanding and is agreeable to plan. The patient was discharged ambulatory and in stable condition.    Daniela Coles PA-C ....................  12/15/2022   11:39 AM

## 2022-12-29 ENCOUNTER — OFFICE VISIT (OUTPATIENT)
Dept: FAMILY MEDICINE | Facility: CLINIC | Age: 72
End: 2022-12-29
Payer: COMMERCIAL

## 2022-12-29 ENCOUNTER — HOSPITAL ENCOUNTER (OUTPATIENT)
Dept: ULTRASOUND IMAGING | Facility: HOSPITAL | Age: 72
Discharge: HOME | End: 2022-12-29
Attending: NURSE PRACTITIONER | Admitting: NURSE PRACTITIONER
Payer: COMMERCIAL

## 2022-12-29 VITALS
SYSTOLIC BLOOD PRESSURE: 137 MMHG | DIASTOLIC BLOOD PRESSURE: 88 MMHG | WEIGHT: 190.8 LBS | HEART RATE: 108 BPM | RESPIRATION RATE: 28 BRPM | TEMPERATURE: 98.4 F | OXYGEN SATURATION: 95 % | BODY MASS INDEX: 28.59 KG/M2

## 2022-12-29 DIAGNOSIS — R30.0 DYSURIA: ICD-10-CM

## 2022-12-29 DIAGNOSIS — N40.0 ENLARGED PROSTATE: Primary | ICD-10-CM

## 2022-12-29 DIAGNOSIS — R33.9 URINARY RETENTION: ICD-10-CM

## 2022-12-29 DIAGNOSIS — R35.0 URINARY FREQUENCY: ICD-10-CM

## 2022-12-29 DIAGNOSIS — N40.0 ENLARGED PROSTATE: ICD-10-CM

## 2022-12-29 LAB
ALBUMIN UR-MCNC: NEGATIVE MG/DL
APPEARANCE UR: CLEAR
BILIRUB UR QL STRIP: NEGATIVE
COLOR UR AUTO: YELLOW
GLUCOSE UR STRIP-MCNC: NEGATIVE MG/DL
HGB UR QL STRIP: NEGATIVE
KETONES UR STRIP-MCNC: ABNORMAL MG/DL
LEUKOCYTE ESTERASE UR QL STRIP: NEGATIVE
NITRATE UR QL: NEGATIVE
PH UR STRIP: 5.5 [PH] (ref 5–8)
SP GR UR STRIP: 1.02 (ref 1–1.03)
UROBILINOGEN UR STRIP-ACNC: 0.2 E.U./DL

## 2022-12-29 PROCEDURE — 81003 URINALYSIS AUTO W/O SCOPE: CPT | Performed by: NURSE PRACTITIONER

## 2022-12-29 PROCEDURE — 99214 OFFICE O/P EST MOD 30 MIN: CPT | Performed by: NURSE PRACTITIONER

## 2022-12-29 PROCEDURE — 76857 US EXAM PELVIC LIMITED: CPT

## 2022-12-29 NOTE — PROGRESS NOTES
"Assessment & Plan     Dysuria    - UA macro with reflex to Microscopic and Culture - Clinc Collect    Enlarged prostate    - Adult Urology  Referral    Urinary frequency      Urinary retention    - Adult Urology  Referral     Patient with known enlarged prostate on terazosin with irritative voiding symptoms and change in urinary stream.  UA is negative for infection.  With concern for urinary retention, did do pre and postvoid residual.  Prevoid was 152 mils and post void was 90 mL.    Recommended always double voiding.      Urology referral.  Patient has never seen urology.    Be aware of worsening retention, dribbling, blood in the urine or flank pain or fevers.  Come back or go to ER for this.  With postvoid residual of 90, do not think patient requires catheter at this time.  Patient did void again after returning from ultrasound.               Return in about 1 week (around 1/5/2023).    Lissy Costa [facility] GRETCHEN Garcia is a 72 year old male who presents to clinic today for the following health issues:  Chief Complaint   Patient presents with     UTI     X 2 days. Itching and burning sensation. Frequency and urgency. No foul oder. Darker yellow urine color. Pt states worse today and very \"gassy\"     HPI    Urinary symptoms starting 2 days ago.  Not going normal amount.  Has to void every 30-45 minutes.  Maybe a cup at a time.  +Urgency.      No pain unless actually urinating.  Hurts about midway into the shaft of the penis.      No blood in urine.  No fever, back pain.      Taking terazosin for prostate.      Getting up every 1.5 hours to urinate at night.  Usually will get up 1x per night.      Clear urine.      Having congestion with bronchitis recently.  Has been pushing fluids.      Has never needed a catheter.      Denies:  Flank pain, fevers    Feels pain in anus as well.  Feels it with urination.  No fever or shaking chills.   "           Review of Systems  See HPI      Objective    /88 (BP Location: Right arm, Patient Position: Sitting, Cuff Size: Adult Regular)   Pulse 108   Temp 98.4  F (36.9  C) (Oral)   Resp 28   Wt 86.5 kg (190 lb 12.8 oz)   SpO2 95%   BMI 28.59 kg/m    Physical Exam  Constitutional:       Appearance: He is well-developed.   Eyes:      General:         Right eye: No discharge.         Left eye: No discharge.      Conjunctiva/sclera: Conjunctivae normal.   Pulmonary:      Effort: Pulmonary effort is normal.   Abdominal:      General: Bowel sounds are normal. There is no distension.      Tenderness: There is no abdominal tenderness. There is no right CVA tenderness, left CVA tenderness or guarding.   Musculoskeletal:         General: Normal range of motion.   Skin:     General: Skin is warm.   Neurological:      Mental Status: He is alert and oriented to person, place, and time.   Psychiatric:         Mood and Affect: Mood normal.         Behavior: Behavior normal.         Thought Content: Thought content normal.         Judgment: Judgment normal.            Results for orders placed or performed in visit on 12/29/22 (from the past 24 hour(s))   UA macro with reflex to Microscopic and Culture - Clinc Collect    Specimen: Urine, Clean Catch   Result Value Ref Range    Color Urine Yellow Colorless, Straw, Light Yellow, Yellow    Appearance Urine Clear Clear    Glucose Urine Negative Negative mg/dL    Bilirubin Urine Negative Negative    Ketones Urine Trace (A) Negative mg/dL    Specific Gravity Urine 1.025 1.005 - 1.030    Blood Urine Negative Negative    pH Urine 5.5 5.0 - 8.0    Protein Albumin Urine Negative Negative mg/dL    Urobilinogen Urine 0.2 0.2, 1.0 E.U./dL    Nitrite Urine Negative Negative    Leukocyte Esterase Urine Negative Negative    Narrative    Microscopic not indicated

## 2022-12-29 NOTE — PATIENT INSTRUCTIONS
You are having a little bit of urinary retention, but not enough to where I think you need a catheter at this time.  Be sure to urinate at least twice per episode.     I put in a urology referral.  Please call them and be seen as soon as possible.  You may need to speak to the triage nurse.  I recommend being seen in about 1 week.    If your symptoms are worsening, including fevers, or you cannot urinate at all or only dribbling or have blood, please go to emergency room.

## 2023-05-03 ENCOUNTER — TRANSFERRED RECORDS (OUTPATIENT)
Dept: HEALTH INFORMATION MANAGEMENT | Facility: CLINIC | Age: 73
End: 2023-05-03
Payer: COMMERCIAL

## 2023-05-04 ENCOUNTER — OFFICE VISIT (OUTPATIENT)
Dept: FAMILY MEDICINE | Facility: CLINIC | Age: 73
End: 2023-05-04
Attending: FAMILY MEDICINE
Payer: COMMERCIAL

## 2023-05-04 VITALS
SYSTOLIC BLOOD PRESSURE: 128 MMHG | OXYGEN SATURATION: 97 % | HEART RATE: 95 BPM | BODY MASS INDEX: 28.77 KG/M2 | DIASTOLIC BLOOD PRESSURE: 80 MMHG | TEMPERATURE: 97.9 F | RESPIRATION RATE: 20 BRPM | WEIGHT: 192 LBS

## 2023-05-04 DIAGNOSIS — I10 ESSENTIAL HYPERTENSION: Primary | ICD-10-CM

## 2023-05-04 DIAGNOSIS — E03.9 HYPOTHYROIDISM, UNSPECIFIED TYPE: ICD-10-CM

## 2023-05-04 DIAGNOSIS — R73.01 IMPAIRED FASTING GLUCOSE: ICD-10-CM

## 2023-05-04 DIAGNOSIS — E87.6 HYPOKALEMIA: ICD-10-CM

## 2023-05-04 DIAGNOSIS — N40.1 BENIGN PROSTATIC HYPERPLASIA WITH LOWER URINARY TRACT SYMPTOMS, SYMPTOM DETAILS UNSPECIFIED: ICD-10-CM

## 2023-05-04 LAB
ANION GAP SERPL CALCULATED.3IONS-SCNC: 13 MMOL/L (ref 7–15)
BUN SERPL-MCNC: 20.2 MG/DL (ref 8–23)
CALCIUM SERPL-MCNC: 9.6 MG/DL (ref 8.8–10.2)
CHLORIDE SERPL-SCNC: 99 MMOL/L (ref 98–107)
CREAT SERPL-MCNC: 1.25 MG/DL (ref 0.67–1.17)
DEPRECATED HCO3 PLAS-SCNC: 26 MMOL/L (ref 22–29)
GFR SERPL CREATININE-BSD FRML MDRD: 61 ML/MIN/1.73M2
GLUCOSE SERPL-MCNC: 112 MG/DL (ref 70–99)
HBA1C MFR BLD: 5.8 % (ref 0–5.6)
HOLD SPECIMEN: NORMAL
POTASSIUM SERPL-SCNC: 3.8 MMOL/L (ref 3.4–5.3)
SODIUM SERPL-SCNC: 138 MMOL/L (ref 136–145)
TSH SERPL DL<=0.005 MIU/L-ACNC: 6.05 UIU/ML (ref 0.3–4.2)

## 2023-05-04 PROCEDURE — 99214 OFFICE O/P EST MOD 30 MIN: CPT | Performed by: FAMILY MEDICINE

## 2023-05-04 PROCEDURE — 84443 ASSAY THYROID STIM HORMONE: CPT | Performed by: FAMILY MEDICINE

## 2023-05-04 PROCEDURE — 83036 HEMOGLOBIN GLYCOSYLATED A1C: CPT | Performed by: FAMILY MEDICINE

## 2023-05-04 PROCEDURE — 80048 BASIC METABOLIC PNL TOTAL CA: CPT | Performed by: FAMILY MEDICINE

## 2023-05-04 PROCEDURE — 36415 COLL VENOUS BLD VENIPUNCTURE: CPT | Performed by: FAMILY MEDICINE

## 2023-05-04 RX ORDER — TERAZOSIN 10 MG/1
10 CAPSULE ORAL AT BEDTIME
Qty: 90 CAPSULE | Refills: 3 | Status: SHIPPED | OUTPATIENT
Start: 2023-05-04 | End: 2023-11-08

## 2023-05-04 NOTE — PROGRESS NOTES
Assessment/Plan:    Essential hypertension  Hypertension appears well controlled blood pressure 112/76 on chlorthalidone 25 mg daily.  Patient will increase terazosin from 5 mg up to 10 mg at bedtime as well due to BPH history and consider holding chlorthalidone as well as potassium supplement if hypotensive blood pressure readings or symptomatic concerns for orthostatic hypotension.    Impaired fasting glucose  A1c today at 5.8% following fasting glucose of 117 at physical exam November 3, 2022 and will update A1c and fasting glucose today.  Maintain weight less than 190 pounds initially, less than 185 pounds ideally.  - Hemoglobin A1c  - Basic metabolic panel    Hypothyroidism, unspecified type  Mild TSH elevation at 4.52 improved from 6.31 previously.  Will update TSH today to ensure ongoing improvement and resolution of mild TSH elevation.  Consider low-dose thyroid replacement as indicated.  - TSH  - TSH    Hypokalemia  Continuing potassium chloride 10 mEq daily while on chlorthalidone 25 mg daily.  Ensure appropriate potassium replacement.  - Basic metabolic panel  - Basic metabolic panel    Benign prostatic hyperplasia with lower urinary tract symptoms, symptom details unspecified  Significant BPH on exam.  Did complete prostate MRI as well as recent TRUS with biopsy procedure yesterday with 16 samples currently pending.  Recommendation to increase terazosin from 5 mg up to 10 mg daily dose for BPH management.  New prescription sent to local pharmacy.  - terazosin (HYTRIN) 10 MG capsule  Dispense: 90 capsule; Refill: 3        Subjective:    Eduardo Collins is seen today for follow-up assessment.  Recent evaluation with prostate MRI with subsequent TRUS with biopsy x16 with pending results.  Follows with [facility].  Had suggested increasing terazosin from 5 mg up to 10 mg due to BPH findings.  Continuing atorvastatin 40 mg daily.  Prior mild TSH elevation without thyroid replacement historically.   Chlorthalidone 25 mg daily for hypertension with potassium chloride 10 mEq daily potassium replacement.  Impaired fasting glucose with fasting glucose 117.  Had COVID infection in 2023 without residual long COVID findings.  Comprehensive review of systems as above otherwise all negative.        23 FYI:   Hi,   I have an upcoming appointment with you on Thursday, and wanted to update you on some developments with my prostate since last visit. Should have kept you in the loop on this sooner. My apologies.   Went to urgent care in December for what I thought was a UTI. It was not. They thought bladder emptying issue and referred to [facility]. They thought bladder emptying was fine, recommended PSA...came back 10. Reran in a month...came back 14. Recommended MRI...done at [facility] ...came back with something there, gave it a 3 out of 5 rating. Recommended needle biopsy. Dr. Toledo is doing procedure on Wednesday. Will have him forward all results to you. We can discuss further on Thursday.          - Yas x    No children   Tobacco:  none   EtOH:  < 1 per day (beer and make cranberry wine)   Mom -  94 multiple myeloma   Dad -  77 fall with cerebral hemorrhage (lived another 10 years..) but  of CVA   1 older bro -  CHF   2 sis - still living   Surgeries:  right 5th finger ORIF; appy; bilateral 1st MTP joint calcification   Hospitalizations:  dizziness (r/o CVA)   Retired x 10 years -  ([employer], etc.)   Hobbies:  build things, , camping, fishing, hunting, hiking, canoeing, outdoor activities in the yard, etc.    Past Surgical History:   Procedure Laterality Date     APPENDECTOMY       TOE SURGERY Bilateral     Had bilateral great toe surgery in past        Family History   Problem Relation Age of Onset     Cancer Mother         skin and multiple myeloma, latter was fatal     Cerebrovascular Disease Father         brain hemorrhage at 67 and  stroke that caused dysphagia at 77     Hypertension Sister      Hypertension Sister      Hypertension Brother      Heart Failure Brother      Coronary Stenting Brother      ICD - Implantable Cardioverter Defibrillator Brother         [facility]     Acute Myocardial Infarction Brother         Past Medical History:   Diagnosis Date     Arthritis      Depression 01/01/1975    Controlled per patient, on medication     Essential hypertension 01/01/1988     Hayfever     Seasonal per patient     History of prostatitis      Hyperlipemia 01/01/1988    atorvastatin     Localized primary osteoarthrosis of carpometacarpal joint of right wrist 07/15/2015     Premature atrial beats 08/02/2019        Social History     Tobacco Use     Smoking status: Never     Smokeless tobacco: Never   Substance Use Topics     Alcohol use: Yes     Alcohol/week: 5.0 standard drinks of alcohol     Types: 5 Cans of beer per week     Comment: usually has a couple of beers 2-3 x per week     Drug use: No        Current Outpatient Medications   Medication Sig Dispense Refill     atorvastatin (LIPITOR) 40 MG tablet Take 1 tablet (40 mg) by mouth daily 90 tablet 3     chlorthalidone (HYGROTON) 25 MG tablet Take 1 tablet (25 mg) by mouth daily 90 tablet 3     cholecalciferol, vitamin D3, 125 mcg (5,000 unit) TbDL [CHOLECALCIFEROL, VITAMIN D3, 125 MCG (5,000 UNIT) TBDL] Take 5,000 Units by mouth once a week. Takes on Mondays  Taking one a week during summer and then daily in the winter       potassium chloride ER (K-TAB/KLOR-CON) 10 MEQ CR tablet Take 1 tablet (10 mEq) by mouth daily 90 tablet 3     terazosin (HYTRIN) 10 MG capsule Take 1 capsule (10 mg) by mouth At Bedtime 90 capsule 3     tetrahydrozoline-zinc (VISINE-AC) 0.05-0.25 % ophthalmic solution Place into both eyes 4 times daily as needed       triamcinolone (NASACORT) 55 mcg nasal inhaler Spray 1-2 sprays into both nostrils 2 times daily as needed             Objective:    Vitals:    05/04/23 0951   BP: 128/80   Pulse: 95   Resp: 20   Temp: 97.9  F (36.6  C)   SpO2: 97%   Weight: 87.1 kg (192 lb)      Body mass index is 28.77 kg/m .    Alert.  No apparent distress.  Chest clear.  Cardiac exam regular.  Extremities warm and dry.      This note has been dictated using voice recognition software and as a result may contain minor grammatical errors and unintended word substitutions.       Answers for HPI/ROS submitted by the patient on 4/30/2023  Frequency of checking blood sugars:: not at all  Diabetic concerns:: none  Paraesthesia present:: none of these symptoms  Since last visit, patient describes the following symptoms:: None  How many servings of fruits and vegetables do you eat daily?: 2-3  On average, how many sweetened beverages do you drink each day (Examples: soda, juice, sweet tea, etc.  Do NOT count diet or artificially sweetened beverages)?: 1  How many minutes a day do you exercise enough to make your heart beat faster?: 30 to 60  How many days a week do you exercise enough to make your heart beat faster?: 6  How many days per week do you miss taking your medication?: 0

## 2023-05-05 DIAGNOSIS — E03.9 HYPOTHYROIDISM, UNSPECIFIED TYPE: Primary | ICD-10-CM

## 2023-05-05 RX ORDER — LEVOTHYROXINE SODIUM 50 UG/1
50 TABLET ORAL DAILY
Qty: 30 TABLET | Refills: 5 | Status: SHIPPED | OUTPATIENT
Start: 2023-05-05 | End: 2023-08-28

## 2023-05-27 DIAGNOSIS — E03.9 HYPOTHYROIDISM, UNSPECIFIED TYPE: ICD-10-CM

## 2023-05-28 RX ORDER — LEVOTHYROXINE SODIUM 50 UG/1
TABLET ORAL
Qty: 30 TABLET | Refills: 5 | OUTPATIENT
Start: 2023-05-28

## 2023-07-26 ENCOUNTER — TRANSFERRED RECORDS (OUTPATIENT)
Dept: HEALTH INFORMATION MANAGEMENT | Facility: CLINIC | Age: 73
End: 2023-07-26
Payer: COMMERCIAL

## 2023-08-28 DIAGNOSIS — E03.9 HYPOTHYROIDISM, UNSPECIFIED TYPE: ICD-10-CM

## 2023-08-28 RX ORDER — LEVOTHYROXINE SODIUM 50 UG/1
50 TABLET ORAL DAILY
Qty: 90 TABLET | Refills: 1 | Status: SHIPPED | OUTPATIENT
Start: 2023-08-28 | End: 2023-11-08

## 2023-08-28 NOTE — TELEPHONE ENCOUNTER
"Routing refill request to provider for review/approval because:  Labs out of range:  TSH was 6.05 5/4/2023 no updated lab    Last Written Prescription Date:  5/5/2023  Last Fill Quantity: 30,  # refills: 5   Last office visit provider:  5/4/2023    Requested Prescriptions   Pending Prescriptions Disp Refills    levothyroxine (SYNTHROID/LEVOTHROID) 50 MCG tablet [Pharmacy Med Name: LEVOTHYROXINE 50 MCG TABLET] 90 tablet 1     Sig: TAKE 1 TABLET BY MOUTH EVERY DAY       Thyroid Protocol Failed - 8/28/2023 10:06 AM        Failed - Normal TSH on file in past 12 months     Recent Labs   Lab Test 05/04/23  0957   TSH 6.05*              Passed - Patient is 12 years or older        Passed - Recent (12 mo) or future (30 days) visit within the authorizing provider's specialty     Patient has had an office visit with the authorizing provider or a provider within the authorizing providers department within the previous 12 mos or has a future within next 30 days. See \"Patient Info\" tab in inbasket, or \"Choose Columns\" in Meds & Orders section of the refill encounter.              Passed - Medication is active on med list             Amara Metcalf RN 08/28/23 2:48 PM  "

## 2023-09-12 ENCOUNTER — TRANSFERRED RECORDS (OUTPATIENT)
Dept: HEALTH INFORMATION MANAGEMENT | Facility: CLINIC | Age: 73
End: 2023-09-12

## 2023-10-28 DIAGNOSIS — E87.6 HYPOKALEMIA: ICD-10-CM

## 2023-10-30 RX ORDER — POTASSIUM CHLORIDE 750 MG/1
10 TABLET, EXTENDED RELEASE ORAL DAILY
Qty: 90 TABLET | Refills: 1 | Status: SHIPPED | OUTPATIENT
Start: 2023-10-30 | End: 2023-11-08

## 2023-11-03 ASSESSMENT — ENCOUNTER SYMPTOMS
CONSTIPATION: 0
EYE PAIN: 0
HEMATOCHEZIA: 0
JOINT SWELLING: 0
ABDOMINAL PAIN: 0
DYSURIA: 0
NAUSEA: 0
FREQUENCY: 0
HEADACHES: 0
HEMATURIA: 0
FEVER: 0
PARESTHESIAS: 0
ARTHRALGIAS: 1
CHILLS: 0
PALPITATIONS: 0
NERVOUS/ANXIOUS: 0
COUGH: 1
MYALGIAS: 0
SHORTNESS OF BREATH: 0
DIARRHEA: 0
WEAKNESS: 0
SORE THROAT: 0
DIZZINESS: 0
HEARTBURN: 0

## 2023-11-03 ASSESSMENT — ACTIVITIES OF DAILY LIVING (ADL): CURRENT_FUNCTION: NO ASSISTANCE NEEDED

## 2023-11-08 ENCOUNTER — OFFICE VISIT (OUTPATIENT)
Dept: FAMILY MEDICINE | Facility: CLINIC | Age: 73
End: 2023-11-08
Payer: COMMERCIAL

## 2023-11-08 VITALS
DIASTOLIC BLOOD PRESSURE: 84 MMHG | RESPIRATION RATE: 16 BRPM | OXYGEN SATURATION: 92 % | HEIGHT: 70 IN | BODY MASS INDEX: 28.3 KG/M2 | HEART RATE: 98 BPM | WEIGHT: 197.7 LBS | SYSTOLIC BLOOD PRESSURE: 134 MMHG | TEMPERATURE: 98 F

## 2023-11-08 DIAGNOSIS — D12.6 SERRATED ADENOMA OF COLON: ICD-10-CM

## 2023-11-08 DIAGNOSIS — N40.1 BENIGN PROSTATIC HYPERPLASIA WITH LOWER URINARY TRACT SYMPTOMS, SYMPTOM DETAILS UNSPECIFIED: ICD-10-CM

## 2023-11-08 DIAGNOSIS — R05.2 SUBACUTE COUGH: ICD-10-CM

## 2023-11-08 DIAGNOSIS — E55.9 VITAMIN D DEFICIENCY: ICD-10-CM

## 2023-11-08 DIAGNOSIS — Z00.00 ENCOUNTER FOR MEDICARE ANNUAL WELLNESS EXAM: Primary | ICD-10-CM

## 2023-11-08 DIAGNOSIS — K40.90 LEFT INGUINAL HERNIA: ICD-10-CM

## 2023-11-08 DIAGNOSIS — I10 ESSENTIAL HYPERTENSION: ICD-10-CM

## 2023-11-08 DIAGNOSIS — E66.3 OVERWEIGHT: ICD-10-CM

## 2023-11-08 DIAGNOSIS — L29.9 PRURITIC DISORDER: ICD-10-CM

## 2023-11-08 DIAGNOSIS — E78.00 PURE HYPERCHOLESTEROLEMIA: ICD-10-CM

## 2023-11-08 DIAGNOSIS — E03.9 HYPOTHYROIDISM, UNSPECIFIED TYPE: ICD-10-CM

## 2023-11-08 DIAGNOSIS — R73.01 IMPAIRED FASTING GLUCOSE: ICD-10-CM

## 2023-11-08 DIAGNOSIS — E87.6 HYPOKALEMIA: ICD-10-CM

## 2023-11-08 PROBLEM — R97.20 HIGH PROSTATE SPECIFIC ANTIGEN (PSA): Status: ACTIVE | Noted: 2023-09-07

## 2023-11-08 PROBLEM — K63.5 POLYP OF COLON: Status: ACTIVE | Noted: 2023-07-26

## 2023-11-08 PROBLEM — D12.0 BENIGN NEOPLASM OF CECUM: Status: ACTIVE | Noted: 2023-07-28

## 2023-11-08 PROBLEM — K64.9 HEMORRHOIDS: Status: ACTIVE | Noted: 2023-07-26

## 2023-11-08 PROBLEM — K57.30 DIVERTICULAR DISEASE OF LARGE INTESTINE: Status: ACTIVE | Noted: 2018-07-25

## 2023-11-08 PROBLEM — E78.5 HYPERLIPIDEMIA: Status: ACTIVE | Noted: 2023-09-12

## 2023-11-08 PROBLEM — Z87.442 HISTORY OF RENAL CALCULI: Status: ACTIVE | Noted: 2023-11-08

## 2023-11-08 PROBLEM — Z86.0100 HISTORY OF COLONIC POLYPS: Status: ACTIVE | Noted: 2018-07-25

## 2023-11-08 LAB
ALBUMIN SERPL BCG-MCNC: 4.1 G/DL (ref 3.5–5.2)
ALP SERPL-CCNC: 89 U/L (ref 40–129)
ALT SERPL W P-5'-P-CCNC: 30 U/L (ref 0–70)
ANION GAP SERPL CALCULATED.3IONS-SCNC: 12 MMOL/L (ref 7–15)
AST SERPL W P-5'-P-CCNC: 33 U/L (ref 0–45)
BILIRUB SERPL-MCNC: 0.8 MG/DL
BUN SERPL-MCNC: 19.8 MG/DL (ref 8–23)
CALCIUM SERPL-MCNC: 9.8 MG/DL (ref 8.8–10.2)
CHLORIDE SERPL-SCNC: 100 MMOL/L (ref 98–107)
CHOLEST SERPL-MCNC: 133 MG/DL
CREAT SERPL-MCNC: 1.09 MG/DL (ref 0.67–1.17)
DEPRECATED HCO3 PLAS-SCNC: 25 MMOL/L (ref 22–29)
EGFRCR SERPLBLD CKD-EPI 2021: 72 ML/MIN/1.73M2
ERYTHROCYTE [DISTWIDTH] IN BLOOD BY AUTOMATED COUNT: 12.4 % (ref 10–15)
GLUCOSE SERPL-MCNC: 124 MG/DL (ref 70–99)
HBA1C MFR BLD: 6 % (ref 0–5.6)
HCT VFR BLD AUTO: 44 % (ref 40–53)
HDLC SERPL-MCNC: 42 MG/DL
HGB BLD-MCNC: 15.6 G/DL (ref 13.3–17.7)
LDLC SERPL CALC-MCNC: 75 MG/DL
MCH RBC QN AUTO: 31.1 PG (ref 26.5–33)
MCHC RBC AUTO-ENTMCNC: 35.5 G/DL (ref 31.5–36.5)
MCV RBC AUTO: 88 FL (ref 78–100)
NONHDLC SERPL-MCNC: 91 MG/DL
PLATELET # BLD AUTO: 162 10E3/UL (ref 150–450)
POTASSIUM SERPL-SCNC: 3.8 MMOL/L (ref 3.4–5.3)
PROT SERPL-MCNC: 7.5 G/DL (ref 6.4–8.3)
RBC # BLD AUTO: 5.01 10E6/UL (ref 4.4–5.9)
SODIUM SERPL-SCNC: 137 MMOL/L (ref 135–145)
TRIGL SERPL-MCNC: 80 MG/DL
TSH SERPL DL<=0.005 MIU/L-ACNC: 2.61 UIU/ML (ref 0.3–4.2)
VIT D+METAB SERPL-MCNC: 51 NG/ML (ref 20–50)
WBC # BLD AUTO: 8.2 10E3/UL (ref 4–11)

## 2023-11-08 PROCEDURE — 84443 ASSAY THYROID STIM HORMONE: CPT | Performed by: FAMILY MEDICINE

## 2023-11-08 PROCEDURE — 83036 HEMOGLOBIN GLYCOSYLATED A1C: CPT | Performed by: FAMILY MEDICINE

## 2023-11-08 PROCEDURE — 36415 COLL VENOUS BLD VENIPUNCTURE: CPT | Performed by: FAMILY MEDICINE

## 2023-11-08 PROCEDURE — 85027 COMPLETE CBC AUTOMATED: CPT | Performed by: FAMILY MEDICINE

## 2023-11-08 PROCEDURE — 80061 LIPID PANEL: CPT | Performed by: FAMILY MEDICINE

## 2023-11-08 PROCEDURE — 80053 COMPREHEN METABOLIC PANEL: CPT | Performed by: FAMILY MEDICINE

## 2023-11-08 PROCEDURE — 82306 VITAMIN D 25 HYDROXY: CPT | Performed by: FAMILY MEDICINE

## 2023-11-08 PROCEDURE — G0439 PPPS, SUBSEQ VISIT: HCPCS | Performed by: FAMILY MEDICINE

## 2023-11-08 PROCEDURE — 99214 OFFICE O/P EST MOD 30 MIN: CPT | Mod: 25 | Performed by: FAMILY MEDICINE

## 2023-11-08 RX ORDER — TERAZOSIN 10 MG/1
10 CAPSULE ORAL AT BEDTIME
Qty: 90 CAPSULE | Refills: 3 | Status: SHIPPED | OUTPATIENT
Start: 2023-11-08

## 2023-11-08 RX ORDER — RESPIRATORY SYNCYTIAL VIRUS VACCINE 120MCG/0.5
0.5 KIT INTRAMUSCULAR ONCE
Qty: 1 EACH | Refills: 0 | Status: CANCELLED | OUTPATIENT
Start: 2023-11-08 | End: 2023-11-08

## 2023-11-08 RX ORDER — ATORVASTATIN CALCIUM 40 MG/1
40 TABLET, FILM COATED ORAL DAILY
Qty: 90 TABLET | Refills: 3 | Status: SHIPPED | OUTPATIENT
Start: 2023-11-08

## 2023-11-08 RX ORDER — LEVOTHYROXINE SODIUM 50 UG/1
50 TABLET ORAL DAILY
Qty: 90 TABLET | Refills: 3 | Status: SHIPPED | OUTPATIENT
Start: 2023-11-08

## 2023-11-08 RX ORDER — CHLORTHALIDONE 25 MG/1
25 TABLET ORAL DAILY
Qty: 90 TABLET | Refills: 3 | Status: SHIPPED | OUTPATIENT
Start: 2023-11-08

## 2023-11-08 RX ORDER — POTASSIUM CHLORIDE 750 MG/1
10 TABLET, EXTENDED RELEASE ORAL DAILY
Qty: 90 TABLET | Refills: 3 | Status: SHIPPED | OUTPATIENT
Start: 2023-11-08

## 2023-11-08 ASSESSMENT — ENCOUNTER SYMPTOMS
SHORTNESS OF BREATH: 0
EYE PAIN: 0
WEAKNESS: 0
PALPITATIONS: 0
FEVER: 0
CONSTIPATION: 0
PARESTHESIAS: 0
CHILLS: 0
DIARRHEA: 0
SORE THROAT: 0
MYALGIAS: 0
HEMATURIA: 0
ARTHRALGIAS: 1
NAUSEA: 0
NERVOUS/ANXIOUS: 0
ABDOMINAL PAIN: 0
HEADACHES: 0
COUGH: 1
HEARTBURN: 0
HEMATOCHEZIA: 0
JOINT SWELLING: 0
DYSURIA: 0
FREQUENCY: 0
DIZZINESS: 0

## 2023-11-08 ASSESSMENT — ACTIVITIES OF DAILY LIVING (ADL): CURRENT_FUNCTION: NO ASSISTANCE NEEDED

## 2023-11-08 ASSESSMENT — PAIN SCALES - GENERAL: PAINLEVEL: NO PAIN (0)

## 2023-11-08 NOTE — PATIENT INSTRUCTIONS
Patient Education   Personalized Prevention Plan  You are due for the preventive services outlined below.  Your care team is available to assist you in scheduling these services.  If you have already completed any of these items, please share that information with your care team to update in your medical record.  Health Maintenance Due   Topic Date Due     RSV VACCINE (Pregnancy & 60+) (1 - 1-dose 60+ series) Never done     ANNUAL REVIEW OF HM ORDERS  11/03/2023     Learning About Dietary Guidelines  What are the Dietary Guidelines for Americans?     Dietary Guidelines for Americans provide tips for eating well and staying healthy. This helps reduce the risk for long-term (chronic) diseases.  These guidelines recommend that you:  Eat and drink the right amount for you. The U.S[place] government's food guide is called MyPlate. It can help you make your own well-balanced eating plan.  Try to balance your eating with your activity. This helps you stay at a healthy weight.  Drink alcohol in moderation, if at all.  Limit foods high in salt, saturated fat, trans fat, and added sugar.  These guidelines are from the U.S. Department of Agriculture and the U.S. Department of Health and Human Services. They are updated every 5 years.  What is MyPlate?  MyPlate is the U.S. government's food guide. It can help you make your own well-balanced eating plan. A balanced eating plan means that you eat enough, but not too much, and that your food gives you the nutrients you need to stay healthy.  MyPlate focuses on eating plenty of whole grains, fruits, and vegetables, and on limiting fat and sugar. It is available online at www.ChooseMyPlate.gov.  How can you get started?  If you're trying to eat healthier, you can slowly change your eating habits over time. You don't have to make big changes all at once. Start by adding one or two healthy foods to your meals each day.  Grains  Choose whole-grain breads and cereals and whole-wheat pasta and  "whole-grain crackers.  Vegetables  Eat a variety of vegetables every day. They have lots of nutrients and are part of a heart-healthy diet.  Fruits  Eat a variety of fruits every day. Fruits contain lots of nutrients. Choose fresh fruit instead of fruit juice.  Protein foods  Choose fish and lean poultry more often. Eat red meat and fried meats less often. Dried beans, tofu, and nuts are also good sources of protein.  Dairy  Choose low-fat or fat-free products from this food group. If you have problems digesting milk, try eating cheese or yogurt instead.  Fats and oils  Limit fats and oils if you're trying to cut calories. Choose healthy fats when you cook. These include canola oil and olive oil.  Where can you learn more?  Go to https://www.[employer].net/patiented  Enter D676 in the search box to learn more about \"Learning About Dietary Guidelines.\"  Current as of: February 28, 2023               Content Version: 13.8    8374-2495 [employer].   Care instructions adapted under license by your healthcare professional. If you have questions about a medical condition or this instruction, always ask your healthcare professional. [employer] disclaims any warranty or liability for your use of this information.      Hearing Loss: Care Instructions  Overview     Hearing loss is a sudden or slow decrease in how well you hear. It can range from slight to profound. Permanent hearing loss can occur with aging. It also can happen when you are exposed long-term to loud noise. Examples include listening to loud music, riding motorcycles, or being around other loud machines.  Hearing loss can affect your work and home life. It can make you feel lonely or depressed. You may feel that you have lost your independence. But hearing aids and other devices can help you hear better and feel connected to others.  Follow-up care is a key part of your treatment and safety. Be sure to make and go to all " appointments, and call your doctor if you are having problems. It's also a good idea to know your test results and keep a list of the medicines you take.  How can you care for yourself at home?  Avoid loud noises whenever possible. This helps keep your hearing from getting worse.  Always wear hearing protection around loud noises.  Wear a hearing aid as directed.  A professional can help you pick a hearing aid that will work best for you.  You can also get hearing aids over the counter for mild to moderate hearing loss.  Have hearing tests as your doctor suggests. They can show whether your hearing has changed. Your hearing aid may need to be adjusted.  Use other devices as needed. These may include:  Telephone amplifiers and hearing aids that can connect to a television, stereo, radio, or microphone.  Devices that use lights or vibrations. These alert you to the doorbell, a ringing telephone, or a baby monitor.  Television closed-captioning. This shows the words at the bottom of the screen. Most new TVs can do this.  TTY (text telephone). This lets you type messages back and forth on the telephone instead of talking or listening. These devices are also called TDD. When messages are typed on the keyboard, they are sent over the phone line to a receiving TTY. The message is shown on a monitor.  Use text messaging, social media, and email if it is hard for you to communicate by telephone.  Try to learn a listening technique called speechreading. It is not lipreading. You pay attention to people's gestures, expressions, posture, and tone of voice. These clues can help you understand what a person is saying. Face the person you are talking to, and have them face you. Make sure the lighting is good. You need to see the other person's face clearly.  Think about counseling if you need help to adjust to your hearing loss.  When should you call for help?  Watch closely for changes in your health, and be sure to contact your  "doctor if:    You think your hearing is getting worse.     You have new symptoms, such as dizziness or nausea.   Where can you learn more?  Go to https://www.[employer].net/patiented  Enter R798 in the search box to learn more about \"Hearing Loss: Care Instructions.\"  Current as of: February 28, 2023               Content Version: 13.8    5262-9355 [employer].   Care instructions adapted under license by your healthcare professional. If you have questions about a medical condition or this instruction, always ask your healthcare professional. [employer] disclaims any warranty or liability for your use of this information.      Preventing Falls: Care Instructions  Injuries and health problems such as trouble walking or poor eyesight can increase your risk of falling. So can some medicines. But there are things you can do to help prevent falls. You can exercise to get stronger. You can also arrange your home to make it safer.    Talk to your doctor about the medicines you take. Ask if any of them increase the risk of falls and whether they can be changed or stopped.   Try to exercise regularly. It can help improve your strength and balance. This can help lower your risk of falling.     Practice fall safety and prevention.    Wear low-heeled shoes that fit well and give your feet good support. Talk to your doctor if you have foot problems that make this hard.  Carry a cellphone or wear a medical alert device that you can use to call for help.  Use stepladders instead of chairs to reach high objects. Don't climb if you're at risk for falls. Ask for help, if needed.  Wear the correct eyeglasses, if you need them.    Make your home safer.    Remove rugs, cords, clutter, and furniture from walkways.  Keep your house well lit. Use night-lights in hallways and bathrooms.  Install and use sturdy handrails on stairways.  Wear nonskid footwear, even inside. Don't walk barefoot or in socks without " "shoes.    Be safe outside.    Use handrails, curb cuts, and ramps whenever possible.  Keep your hands free by using a shoulder bag or backpack.  Try to walk in well-lit areas. Watch out for uneven ground, changes in pavement, and debris.  Be careful in the winter. Walk on the grass or gravel when sidewalks are slippery. Use de-icer on steps and walkways. Add non-slip devices to shoes.    Put grab bars and nonskid mats in your shower or tub and near the toilet. Try to use a shower chair or bath bench when bathing.   Get into a tub or shower by putting in your weaker leg first. Get out with your strong side first. Have a phone or medical alert device in the bathroom with you.   Where can you learn more?  Go to https://www.[employer]/patiented  Enter G117 in the search box to learn more about \"Preventing Falls: Care Instructions.\"  Current as of: July 18, 2023               Content Version: 13.8    7232-5279 [employer].   Care instructions adapted under license by your healthcare professional. If you have questions about a medical condition or this instruction, always ask your healthcare professional. [employer] disclaims any warranty or liability for your use of this information.      How to Get Up Safely After a Fall: Care Instructions  Overview     If you have injuries, health problems, or other reasons that may make it easy for you to fall at home, it is a good idea to learn how to get up safely after a fall. Learning how to get up correctly can help you avoid making an injury worse.  Also, knowing what to do if you cannot get up can help you stay safe until help arrives.  Follow-up care is a key part of your treatment and safety. Be sure to make and go to all appointments, and call your doctor if you are having problems. It's also a good idea to know your test results and keep a list of the medicines you take.  How can you care for yourself after a fall?  If you think you can get " up  First lie still for a few minutes and think about how you feel. If your body feels okay and you think you can get up safely, follow the rest of the steps below:  Look for a chair or other piece of furniture that is close to you.  Roll onto your side and rest. Roll by turning your head in the direction you want to roll, move your shoulder and arm, then hip and leg in the same direction.  Lie still for a moment to let your blood pressure adjust.  Slowly push your upper body up, lift your head, and take a moment to rest.  Slowly get up on your hands and knees, and crawl to the chair or other stable piece of furniture.  Put your hands on the chair.  Move one foot forward, and place it flat on the floor. Your other leg should be bent with the knee on the floor.  Rise slowly, turn your body, and sit in the chair. Stay seated for a bit and think about how you feel. Call for help. Even if you feel okay, let someone know what happened to you. You might not know that you have a serious injury.  If you cannot get up  If you think you are injured after a fall or you cannot get up, try not to panic.  Call out for help.  If you have a phone within reach or you have an emergency call device, use it to call for help.  If you do not have a phone within reach, try to slide yourself toward it. If you cannot get to the phone, try to slide toward a door or window or a place where you think you can be heard.  [place] or use an object to make noise so someone might hear you.  If you can reach something that you can use for a pillow, place it under your head. Try to stay warm by covering yourself with a blanket or clothing while you wait for help.  When should you call for help?   Call 911 anytime you think you may need emergency care. For example, call if:    You passed out (lost consciousness).     You cannot get up after a fall.     You have severe pain.   Call your doctor now or seek immediate medical care if:    You have new or worse  "pain.     You are dizzy or lightheaded.     You hit your head.   Watch closely for changes in your health, and be sure to contact your doctor if:    You do not get better as expected.   Where can you learn more?  Go to https://www.[employer].net/patiented  Enter G513 in the search box to learn more about \"How to Get Up Safely After a Fall: Care Instructions.\"  Current as of: November 13, 2022               Content Version: 13.8    9956-6267 [employer].   Care instructions adapted under license by your healthcare professional. If you have questions about a medical condition or this instruction, always ask your healthcare professional. [employer] disclaims any warranty or liability for your use of this information.         "

## 2023-11-08 NOTE — PROGRESS NOTES
SUBJECTIVE:     Jose is a 73 year old who presents for Preventive Visit.      11/8/2023     8:24 AM   Additional Questions   Roomed by Maricarmen       Are you in the first 12 months of your Medicare coverage?  No    Annual wellness visit completed.  Risk questionnaire reviewed in detail.  Risk associate with prior fall which occurred at home while walking around in a darkened home with eyes partially shot accidentally falling down half flight of stairs per patient without any residual concerns at this time.  Risk as well as suboptimal hearing and suboptimal diet.  Hypertension treated with chlorthalidone 25 mg daily as well as benefits of terazosin which had been increased previously from 5 mg up to 10 mg at bedtime.  Atorvastatin 40 mg daily for cholesterol management.  Hypothyroidism previously noted now on levothyroxine 50 mcg daily needs repeat TSH.  Vitamin D supplement 5000 units weekly during the summer and now 5000 units daily over the last week during the winter months.  Potassium chloride 10 mEq daily while on chlorthalidone.  Follows with Dr. Toledo for BPH and had TRUS with biopsy x17 without evidence for adenocarcinoma prostate.  Had colonoscopy July 26, 2023 with serrated adenoma and told to repeat at 5-year interval.  Had COVID infection in February 2023 and did fine after a week of illness.  Had cold symptoms 6 or 7 weeks ago with some residual cough, nonproductive without shortness of breath.  Itchy skin started around August more on his legs after berry picking and now does extend to arms as well.  Comprehensive review of systems as above otherwise all negative.      4/30/23 FYI:   Hi,   I have an upcoming appointment with you on Thursday, and wanted to update you on some developments with my prostate since last visit. Should have kept you in the loop on this sooner. My apologies.   Went to urgent care in December for what I thought was a UTI. It was not. They thought bladder emptying issue and  "referred to [facility]. They thought bladder emptying was fine, recommended PSA...came back 10. Reran in a month...came back 14. Recommended MRI...done at [facility] ...came back with something there, gave it a 3 out of 5 rating. Recommended needle biopsy. Dr. Toledo is doing procedure on Wednesday. Will have him forward all results to you. We can discuss further on Thursday.        - Yas x    No children   Tobacco:  none   EtOH:  < 1 per day (beer and make cranberry wine)   Mom -  94 multiple myeloma   Dad -  77 fall with cerebral hemorrhage (lived another 10 years..) but  of CVA   1 older bro -  CHF   2 sis - still living   Surgeries:  right 5th finger ORIF; appy; bilateral 1st MTP joint calcification   Hospitalizations:  dizziness (r/o CVA)   Retired x 10 years -  ([employer], etc.)   Hobbies:  build things, , camping, fishing, hunting, hiking, canoeing, outdoor activities in the yard, etc.         Healthy Habits:     In general, how would you rate your overall health?  Good    Frequency of exercise:  6-7 days/week    Duration of exercise:  30-45 minutes    Do you usually eat at least 4 servings of fruit and vegetables a day, include whole grains    & fiber and avoid regularly eating high fat or \"junk\" foods?  No    Taking medications regularly:  Yes    Medication side effects:  Not applicable and None    Ability to successfully perform activities of daily living:  No assistance needed    Home Safety:  No safety concerns identified    Hearing Impairment:  Need to ask people to speak up or repeat themselves    In the past 6 months, have you been bothered by leaking of urine?  No    In general, how would you rate your overall mental or emotional health?  Good    Additional concerns today:  Yes          Have you ever done Advance Care Planning? (For example, a Health Directive, POLST, or a discussion with a medical provider or your loved ones about your " wishes): Yes, advance care planning is on file.       Fall risk  Fallen 2 or more times in the past year?: No  Any fall with injury in the past year?: Yes  click delete button to remove this line now  Cognitive Screening   1) Repeat 3 items (Leader, Season, Table)    2) Clock draw: NORMAL  3) 3 item recall: Recalls 3 objects  Results: 3 items recalled: COGNITIVE IMPAIRMENT LESS LIKELY    Mini-CogTM Copyright LACEY Gatica. Licensed by the author for use in [facility]; reprinted with permission (alyssa@[facility]). All rights reserved.      Do you have sleep apnea, excessive snoring or daytime drowsiness? : no    Reviewed and updated as needed this visit by clinical staff   Tobacco  Allergies  Meds  Problems             Reviewed and updated as needed this visit by Provider    Allergies  Meds  Problems            Social History     Tobacco Use    Smoking status: Never    Smokeless tobacco: Never   Substance Use Topics    Alcohol use: Yes     Alcohol/week: 5.0 standard drinks of alcohol     Types: 5 Cans of beer per week     Comment: usually has a couple of beers 2-3 x per week             11/3/2023     9:26 PM   Alcohol Use   Prescreen: >3 drinks/day or >7 drinks/week? No     Do you have a current opioid prescription? No  Do you use any other controlled substances or medications that are not prescribed by a provider? None              Current providers sharing in care for this patient include:   Patient Care Team:  Dorian Figueroa MD as PCP - General  Dorian Figueroa MD as Assigned PCP    The following health maintenance items are reviewed in Epic and correct as of today:  Health Maintenance   Topic Date Due    RSV VACCINE (Pregnancy & 60+) (1 - 1-dose 60+ series) Never done    TSH W/FREE T4 REFLEX  05/04/2024    MEDICARE ANNUAL WELLNESS VISIT  11/08/2024    ANNUAL REVIEW OF HM ORDERS  11/08/2024    FALL RISK ASSESSMENT  11/08/2024    LIPID  11/03/2027    COLORECTAL CANCER SCREENING  07/26/2028     ADVANCE CARE PLANNING  11/08/2028    DTAP/TDAP/TD IMMUNIZATION (3 - Td or Tdap) 07/22/2031    HEPATITIS C SCREENING  Completed    PHQ-2 (once per calendar year)  Completed    INFLUENZA VACCINE  Completed    Pneumococcal Vaccine: 65+ Years  Completed    ZOSTER IMMUNIZATION  Completed    AORTIC ANEURYSM SCREENING (SYSTEM ASSIGNED)  Completed    COVID-19 Vaccine  Completed    IPV IMMUNIZATION  Aged Out    HPV IMMUNIZATION  Aged Out    MENINGITIS IMMUNIZATION  Aged Out    RSV MONOCLONAL ANTIBODY  Aged Out     Lab work is in process  Labs reviewed in EPIC  BP Readings from Last 3 Encounters:   11/08/23 (!) 122/92   10/14/23 135/87   05/04/23 128/80    Wt Readings from Last 3 Encounters:   11/08/23 89.7 kg (197 lb 11.2 oz)   10/14/23 88.1 kg (194 lb 4.8 oz)   05/04/23 87.1 kg (192 lb)                  Patient Active Problem List   Diagnosis    Hypercholesterolemia    Essential hypertension    Hypokalemia    PVC's (premature ventricular contractions)    Premature atrial beats    Left inguinal hernia    Hypothyroidism, unspecified type    Vitamin D deficiency    Benign neoplasm of cecum    Diverticular disease of colon    Diverticular disease of large intestine    Hemorrhoids    History of colonic polyps    History of renal calculi    Hyperlipidemia    Polyp of colon    High prostate specific antigen (PSA)     Past Surgical History:   Procedure Laterality Date    APPENDECTOMY  1995    TOE SURGERY Bilateral     Had bilateral great toe surgery in past       Social History     Tobacco Use    Smoking status: Never    Smokeless tobacco: Never   Substance Use Topics    Alcohol use: Yes     Alcohol/week: 5.0 standard drinks of alcohol     Types: 5 Cans of beer per week     Comment: usually has a couple of beers 2-3 x per week     Family History   Problem Relation Age of Onset    Cancer Mother         skin and multiple myeloma, latter was fatal    Cerebrovascular Disease Father         brain hemorrhage at 67 and stroke that caused  dysphagia at 77    Hypertension Sister     Hypertension Sister     Hypertension Brother     Heart Failure Brother     Coronary Stenting Brother     ICD - Implantable Cardioverter Defibrillator Brother         care in [place] and [facility]    Acute Myocardial Infarction Brother          Current Outpatient Medications   Medication Sig Dispense Refill    atorvastatin (LIPITOR) 40 MG tablet Take 1 tablet (40 mg) by mouth daily 90 tablet 3    chlorthalidone (HYGROTON) 25 MG tablet Take 1 tablet (25 mg) by mouth daily 90 tablet 3    Cholecalciferol (VITAMIN D3) 1.25 MG (42498 UT) TABS Vitamin D3   5000iu 1/day      cholecalciferol, vitamin D3, 125 mcg (5,000 unit) TbDL [CHOLECALCIFEROL, VITAMIN D3, 125 MCG (5,000 UNIT) TBDL] Take 5,000 Units by mouth once a week. Takes on Mondays  Taking one a week during summer and then daily in the winter      levothyroxine (SYNTHROID/LEVOTHROID) 50 MCG tablet Take 1 tablet (50 mcg) by mouth daily 90 tablet 3    potassium chloride ER (K-TAB/KLOR-CON) 10 MEQ CR tablet Take 1 tablet (10 mEq) by mouth daily 90 tablet 3    terazosin (HYTRIN) 10 MG capsule Take 1 capsule (10 mg) by mouth at bedtime 90 capsule 3    tetrahydrozoline-zinc (VISINE-AC) 0.05-0.25 % ophthalmic solution Place into both eyes 4 times daily as needed      triamcinolone (NASACORT) 55 mcg nasal inhaler Spray 1-2 sprays into both nostrils 2 times daily as needed       Allergies   Allergen Reactions    Seasonal Allergies Other (See Comments)    Trees Itching and Other (See Comments)       Will receive RSV in 2 weeks at pharmacy otherwise immunizations otherwise UTD        Review of Systems   Constitutional:  Negative for chills and fever.   HENT:  Negative for congestion, ear pain, hearing loss and sore throat.    Eyes:  Negative for pain and visual disturbance.   Respiratory:  Positive for cough. Negative for shortness of breath.    Cardiovascular:  Negative for chest pain, palpitations and peripheral edema.  "  Gastrointestinal:  Negative for abdominal pain, constipation, diarrhea, heartburn, hematochezia and nausea.   Genitourinary:  Positive for impotence. Negative for dysuria, frequency, genital sores, hematuria, penile discharge and urgency.   Musculoskeletal:  Positive for arthralgias. Negative for joint swelling and myalgias.   Skin:  Negative for rash.   Neurological:  Negative for dizziness, weakness, headaches and paresthesias.   Psychiatric/Behavioral:  Negative for mood changes. The patient is not nervous/anxious.      Constitutional, HEENT, cardiovascular, pulmonary, GI, , musculoskeletal, neuro, skin, endocrine and psych systems are negative, except as otherwise noted.    OBJECTIVE:   BP (!) 122/92 (BP Location: Left arm, Patient Position: Sitting, Cuff Size: Adult Regular)   Pulse 98   Temp 98  F (36.7  C) (Temporal)   Resp 16   Ht 1.766 m (5' 9.53\")   Wt 89.7 kg (197 lb 11.2 oz)   SpO2 92%   BMI 28.75 kg/m   Estimated body mass index is 28.75 kg/m  as calculated from the following:    Height as of this encounter: 1.766 m (5' 9.53\").    Weight as of this encounter: 89.7 kg (197 lb 11.2 oz).      Physical Exam  GENERAL: healthy, alert and no distress.  BMI 28.75.  EYES: Eyes grossly normal to inspection, PERRL and conjunctivae and sclerae normal  HENT: ear canals and TM's normal, nose and mouth without ulcers or lesions  NECK: no adenopathy, no asymmetry, masses, or scars and thyroid normal to palpation  RESP: lungs clear to auscultation - no rales, rhonchi or wheezes  CV: regular rate and rhythm, normal S1 S2, no S3 or S4, no murmur, click or rub, no peripheral edema and peripheral pulses strong  ABDOMEN: soft, nontender, no hepatosplenomegaly, no masses and bowel sounds normal   (male): normal male genitalia without lesions or urethral discharge, no hernia  RECTAL: deferred to Dr. Toledo with h/o BPH  MS: no gross musculoskeletal defects noted, no edema  SKIN: no suspicious lesions or " rashes  NEURO: Normal strength and tone, mentation intact and speech normal  PSYCH: mentation appears normal, affect normal/bright    Diagnostic Test Results:  Labs reviewed in Epic  No results found for this or any previous visit (from the past 24 hour(s)).    ASSESSMENT / PLAN:     Encounter for Medicare annual wellness exam  Annual wellness visit completed.  Risk questionnaire reviewed with suboptimal diet, hearing loss as well as recent fall as described a month ago without residual injury.  Falls risk prevention discussed.  Annual wellness visits to continue.    Hypothyroidism, unspecified type  Utilizing levothyroxine 50 mcg daily since recent TSH elevation of 6.05.  Update TSH today.  - levothyroxine (SYNTHROID/LEVOTHROID) 50 MCG tablet  Dispense: 90 tablet; Refill: 3  - TSH with free T4 reflex    Hypokalemia  Hypokalemia continues potassium chloride 10 mEq daily.  - potassium chloride ER (K-TAB/KLOR-CON) 10 MEQ CR tablet  Dispense: 90 tablet; Refill: 3    Essential hypertension  Chlorthalidone 25 mg daily for hypertension management with blood pressure 134/84 on recheck.  - chlorthalidone (HYGROTON) 25 MG tablet  Dispense: 90 tablet; Refill: 3    Pure hypercholesterolemia  Continuing atorvastatin 40 mg daily and will update lipid cascade today while fasting.  - atorvastatin (LIPITOR) 40 MG tablet  Dispense: 90 tablet; Refill: 3  - lipid    Impaired fasting glucose  Check A1c and fasting glucose.  Maintain weight less than 190 pounds initially, less than 185 pounds ideally.  - Comprehensive metabolic panel  - Hemoglobin A1c    Benign prostatic hyperplasia with lower urinary tract symptoms, symptom details unspecified  BPH with urinary obstruction better with terazosin 10 mg at bedtime.  Recent TRUS with biopsy negative for prostate cancer with prior PSA elevation noted.  - terazosin (HYTRIN) 10 MG capsule  Dispense: 90 capsule; Refill: 3    Vitamin D deficiency  Vitamin D deficiency.  Currently using 5000  "units daily over past week due to the winter months otherwise was using once weekly during the summer.  - Vitamin D Deficiency    Left inguinal hernia  Small reducible left inguinal hernia asymptomatic and patient declines referral for definitive herniorrhaphy procedure.    Overweight  Maintain weight less than 190 pounds initially, less than 185 pounds ideally.    Pruritic disorder  CBC and comprehensive metabolic panel obtained.  No evidence for jaundice on exam.  Cetirizine 10 mg daily x14 days to see if benefits noted.  Moisturizing lotions etc. in case of component of xerosis.  - CBC with platelets  - Comprehensive metabolic panel    Serrated adenoma of colon  Serrated adenoma of colon on colonoscopy July 26, 2023 and will repeat at 5-year interval.    Subacute cough  Subacute cough over past 6 to 7 weeks since initial illness.  Lungs clear on exam.  No respiratory distress.  Patient declines further evaluation with chest x-ray etc.       Patient has been advised of split billing requirements and indicates understanding: Yes      COUNSELING:  Reviewed preventive health counseling, as reflected in patient instructions       Regular exercise       Healthy diet/nutrition       Vision screening       Hearing screening       Dental care       Bladder control       Fall risk prevention       Colon cancer screening       Prostate cancer screening      BMI:   Estimated body mass index is 28.75 kg/m  as calculated from the following:    Height as of this encounter: 1.766 m (5' 9.53\").    Weight as of this encounter: 89.7 kg (197 lb 11.2 oz).         He reports that he has never smoked. He has never used smokeless tobacco.      Appropriate preventive services were discussed with this patient, including applicable screening as appropriate for fall prevention, nutrition, physical activity, Tobacco-use cessation, weight loss and cognition.  Checklist reviewing preventive services available has been given to the " patient.    Reviewed patients plan of care and provided an AVS. The Basic Care Plan (routine screening as documented in Health Maintenance) for Eduardo meets the Care Plan requirement. This Care Plan has been established and reviewed with the Patient.          Dorian Figueroa MD  [facility]    Identified Health Risks:  I have reviewed Opioid Use Disorder and Substance Use Disorder risk factors and made any needed referrals. The patient was counseled and encouraged to consider modifying their diet and eating habits. He was provided with information on recommended healthy diet options.  The patient was provided with written information regarding signs of hearing loss.  He is at risk for falling and has been provided with information to reduce the risk of falling at home.

## 2024-02-22 ENCOUNTER — PATIENT OUTREACH (OUTPATIENT)
Dept: GASTROENTEROLOGY | Facility: CLINIC | Age: 74
End: 2024-02-22
Payer: COMMERCIAL

## 2024-03-15 ENCOUNTER — TRANSFERRED RECORDS (OUTPATIENT)
Dept: HEALTH INFORMATION MANAGEMENT | Facility: CLINIC | Age: 74
End: 2024-03-15
Payer: COMMERCIAL

## 2024-10-26 DIAGNOSIS — N40.1 BENIGN PROSTATIC HYPERPLASIA WITH LOWER URINARY TRACT SYMPTOMS, SYMPTOM DETAILS UNSPECIFIED: ICD-10-CM

## 2024-10-26 DIAGNOSIS — E87.6 HYPOKALEMIA: ICD-10-CM

## 2024-10-26 DIAGNOSIS — E03.9 HYPOTHYROIDISM, UNSPECIFIED TYPE: ICD-10-CM

## 2024-10-28 RX ORDER — POTASSIUM CHLORIDE 750 MG/1
10 TABLET, EXTENDED RELEASE ORAL DAILY
Qty: 30 TABLET | Refills: 0 | Status: SHIPPED | OUTPATIENT
Start: 2024-10-28

## 2024-10-28 RX ORDER — LEVOTHYROXINE SODIUM 50 UG/1
50 TABLET ORAL DAILY
Qty: 30 TABLET | Refills: 0 | Status: SHIPPED | OUTPATIENT
Start: 2024-10-28

## 2024-10-28 RX ORDER — TERAZOSIN 10 MG/1
10 CAPSULE ORAL AT BEDTIME
Qty: 30 CAPSULE | Refills: 0 | Status: SHIPPED | OUTPATIENT
Start: 2024-10-28

## 2024-11-20 DIAGNOSIS — N40.1 BENIGN PROSTATIC HYPERPLASIA WITH LOWER URINARY TRACT SYMPTOMS, SYMPTOM DETAILS UNSPECIFIED: ICD-10-CM

## 2024-11-20 DIAGNOSIS — E87.6 HYPOKALEMIA: ICD-10-CM

## 2024-11-20 DIAGNOSIS — E03.9 HYPOTHYROIDISM, UNSPECIFIED TYPE: ICD-10-CM

## 2024-11-20 RX ORDER — TERAZOSIN 10 MG/1
10 CAPSULE ORAL AT BEDTIME
Qty: 90 CAPSULE | Refills: 1 | Status: SHIPPED | OUTPATIENT
Start: 2024-11-20

## 2024-11-20 RX ORDER — LEVOTHYROXINE SODIUM 50 UG/1
50 TABLET ORAL DAILY
Qty: 90 TABLET | Refills: 1 | Status: SHIPPED | OUTPATIENT
Start: 2024-11-20

## 2024-11-20 RX ORDER — POTASSIUM CHLORIDE 750 MG/1
10 TABLET, EXTENDED RELEASE ORAL DAILY
Qty: 90 TABLET | Refills: 1 | Status: SHIPPED | OUTPATIENT
Start: 2024-11-20

## 2024-12-19 DIAGNOSIS — I10 ESSENTIAL HYPERTENSION: ICD-10-CM

## 2024-12-19 RX ORDER — CHLORTHALIDONE 25 MG/1
25 TABLET ORAL DAILY
Qty: 90 TABLET | Refills: 3 | Status: SHIPPED | OUTPATIENT
Start: 2024-12-19

## 2025-01-23 DIAGNOSIS — E78.00 PURE HYPERCHOLESTEROLEMIA: ICD-10-CM

## 2025-01-23 RX ORDER — ATORVASTATIN CALCIUM 40 MG/1
40 TABLET, FILM COATED ORAL DAILY
Qty: 90 TABLET | Refills: 2 | Status: SHIPPED | OUTPATIENT
Start: 2025-01-23

## 2025-03-14 ENCOUNTER — TRANSFERRED RECORDS (OUTPATIENT)
Dept: HEALTH INFORMATION MANAGEMENT | Facility: CLINIC | Age: 75
End: 2025-03-14
Payer: COMMERCIAL

## 2025-05-24 ENCOUNTER — HEALTH MAINTENANCE LETTER (OUTPATIENT)
Age: 75
End: 2025-05-24

## 2025-05-28 ENCOUNTER — OFFICE VISIT (OUTPATIENT)
Dept: FAMILY MEDICINE | Facility: CLINIC | Age: 75
End: 2025-05-28
Payer: COMMERCIAL

## 2025-05-28 ENCOUNTER — RESULTS FOLLOW-UP (OUTPATIENT)
Dept: FAMILY MEDICINE | Facility: CLINIC | Age: 75
End: 2025-05-28

## 2025-05-28 VITALS
RESPIRATION RATE: 17 BRPM | WEIGHT: 190 LBS | HEART RATE: 103 BPM | BODY MASS INDEX: 28.14 KG/M2 | TEMPERATURE: 97.6 F | HEIGHT: 69 IN | SYSTOLIC BLOOD PRESSURE: 122 MMHG | DIASTOLIC BLOOD PRESSURE: 80 MMHG | OXYGEN SATURATION: 95 %

## 2025-05-28 DIAGNOSIS — Z23 ENCOUNTER FOR IMMUNIZATION: ICD-10-CM

## 2025-05-28 DIAGNOSIS — E03.9 HYPOTHYROIDISM, UNSPECIFIED TYPE: ICD-10-CM

## 2025-05-28 DIAGNOSIS — E55.9 VITAMIN D DEFICIENCY: ICD-10-CM

## 2025-05-28 DIAGNOSIS — N40.1 BENIGN PROSTATIC HYPERPLASIA WITH LOWER URINARY TRACT SYMPTOMS, SYMPTOM DETAILS UNSPECIFIED: ICD-10-CM

## 2025-05-28 DIAGNOSIS — R73.01 IMPAIRED FASTING GLUCOSE: ICD-10-CM

## 2025-05-28 DIAGNOSIS — E78.00 PURE HYPERCHOLESTEROLEMIA: ICD-10-CM

## 2025-05-28 DIAGNOSIS — N52.9 ERECTILE DYSFUNCTION, UNSPECIFIED ERECTILE DYSFUNCTION TYPE: ICD-10-CM

## 2025-05-28 DIAGNOSIS — I10 ESSENTIAL HYPERTENSION: Primary | ICD-10-CM

## 2025-05-28 LAB
EST. AVERAGE GLUCOSE BLD GHB EST-MCNC: 123 MG/DL
HBA1C MFR BLD: 5.9 % (ref 0–5.6)

## 2025-05-28 PROCEDURE — G2211 COMPLEX E/M VISIT ADD ON: HCPCS | Performed by: FAMILY MEDICINE

## 2025-05-28 PROCEDURE — 84443 ASSAY THYROID STIM HORMONE: CPT | Performed by: FAMILY MEDICINE

## 2025-05-28 PROCEDURE — 3074F SYST BP LT 130 MM HG: CPT | Performed by: FAMILY MEDICINE

## 2025-05-28 PROCEDURE — 91320 SARSCV2 VAC 30MCG TRS-SUC IM: CPT | Performed by: FAMILY MEDICINE

## 2025-05-28 PROCEDURE — 36415 COLL VENOUS BLD VENIPUNCTURE: CPT | Performed by: FAMILY MEDICINE

## 2025-05-28 PROCEDURE — 82306 VITAMIN D 25 HYDROXY: CPT | Performed by: FAMILY MEDICINE

## 2025-05-28 PROCEDURE — 90480 ADMN SARSCOV2 VAC 1/ONLY CMP: CPT | Performed by: FAMILY MEDICINE

## 2025-05-28 PROCEDURE — 80048 BASIC METABOLIC PNL TOTAL CA: CPT | Performed by: FAMILY MEDICINE

## 2025-05-28 PROCEDURE — 80061 LIPID PANEL: CPT | Performed by: FAMILY MEDICINE

## 2025-05-28 PROCEDURE — 83036 HEMOGLOBIN GLYCOSYLATED A1C: CPT | Performed by: FAMILY MEDICINE

## 2025-05-28 PROCEDURE — 99214 OFFICE O/P EST MOD 30 MIN: CPT | Performed by: FAMILY MEDICINE

## 2025-05-28 PROCEDURE — 3079F DIAST BP 80-89 MM HG: CPT | Performed by: FAMILY MEDICINE

## 2025-05-28 PROCEDURE — 1126F AMNT PAIN NOTED NONE PRSNT: CPT | Performed by: FAMILY MEDICINE

## 2025-05-28 RX ORDER — LEVOTHYROXINE SODIUM 50 UG/1
50 TABLET ORAL DAILY
Qty: 90 TABLET | Refills: 3 | Status: SHIPPED | OUTPATIENT
Start: 2025-05-28

## 2025-05-28 RX ORDER — TERAZOSIN 10 MG/1
10 CAPSULE ORAL AT BEDTIME
Qty: 90 CAPSULE | Refills: 3 | Status: SHIPPED | OUTPATIENT
Start: 2025-05-28

## 2025-05-28 RX ORDER — TADALAFIL 10 MG/1
10 TABLET ORAL DAILY PRN
Qty: 30 TABLET | Refills: 1 | Status: SHIPPED | OUTPATIENT
Start: 2025-05-28

## 2025-05-28 ASSESSMENT — PAIN SCALES - GENERAL: PAINLEVEL_OUTOF10: NO PAIN (0)

## 2025-05-28 NOTE — PROGRESS NOTES
Assessment/Plan:    Essential hypertension  Hypertension appears well-controlled with chlorthalidone 25 mg daily and terazosin 10 mg at bedtime.  - Basic metabolic panel    Pure hypercholesterolemia  Check lipid cascade today while on atorvastatin 40 mg daily.  Maintain weight less than 185 pounds initially, less than 180 pounds ideally.  - Lipid panel reflex to direct LDL Fasting    Hypothyroidism, unspecified type  Levothyroxine 50 mcg daily.  Update TSH to ensure adequate replacement.  - TSH with free T4 reflex  - levothyroxine (SYNTHROID/LEVOTHROID) 50 MCG tablet  Dispense: 90 tablet; Refill: 3    Impaired fasting glucose  Impaired fasting glucose with prior A1c at 6.3% and will update A1c and fasting glucose with weight goal less than 185 pounds initially, less than 180 pounds ideally.  - Basic metabolic panel  - Hemoglobin A1c    Vitamin D deficiency  Utilizing vitamin D 5000 units every other day during the summer and every day during the winter.  Update vitamin D level.  Prior level was slightly elevated at 55.  - Vitamin D Deficiency    Encounter for immunization  Update COVID booster today based on age criteria.  - COVID-19 12+ (PFIZER)    Benign prostatic hyperplasia with lower urinary tract symptoms, symptom details unspecified  Refill provided on terazosin 10 mg at bedtime with benefits described.  - terazosin (HYTRIN) 10 MG capsule  Dispense: 90 capsule; Refill: 3    Erectile dysfunction, unspecified erectile dysfunction type  Tadalafil 10 mg use as directed for erectile dysfunction.  - tadalafil (CIALIS) 10 MG tablet  Dispense: 30 tablet; Refill: 1      The following high BMI interventions were performed this visit: encouragement to exercise, weight monitoring, weight loss from baseline weight, and lifestyle education regarding diet .  Ensure ongoing efforts to achieve weight goal < 185 pounds initially, < 180 pounds ideally.         Subjective:    Ricardo Joshi is seen today for follow-up  assessment.  Hypertension.  Chlorthalidone 25 mg daily and terazosin 10 mg at bedtime.  Atorvastatin 40 mg daily for lipid management.  Levothyroxine 50 mcg daily for thyroid replacement.  Vitamin D supplement 5000 units every other day during the summer and every day during the winter.  Prior vitamin D level slightly elevated.  Paired fasting glucose.  Prior A1c at 6.3%.  Celebrating his 75th birthday.  Had wellness visit 2024.  Describes ED concerns.  Would like to try medication.  Wife is seeing Dr. Olga Dang with treatment for dyspareunia described.       - Karyn x    No children   Tobacco:  none   EtOH:  < 1 per day (beer and make cranberry wine)   Mom -  94 multiple myeloma   Dad -  77 fall with cerebral hemorrhage (lived another 10 years..) but  of CVA   1 older bro -  CHF   2 sis - still living   Surgeries:  right 5th finger ORIF; appy; bilateral 1st MTP joint calcification   Hospitalizations:  dizziness (r/o CVA)   Retired x 10 years -  ([employer], etc.)   Hobbies:  build things, , camping, fishing, hunting, hiking, canoeing, outdoor activities in the yard, etc.         Past Surgical History:   Procedure Laterality Date    APPENDECTOMY      TOE SURGERY Bilateral     Had bilateral great toe surgery in past        Family History   Problem Relation Age of Onset    Cancer Mother         skin and multiple myeloma, latter was fatal    Cerebrovascular Disease Father         brain hemorrhage at 67 and stroke that caused dysphagia at 77    Hypertension Sister     Hypertension Sister     Hypertension Brother     Heart Failure Brother     Coronary Stenting Brother     ICD - Implantable Cardioverter Defibrillator Brother         care in [place] and [facility]    Acute Myocardial Infarction Brother         Past Medical History:   Diagnosis Date    Arthritis     Depression 1975    Controlled per patient, on medication    Essential  "hypertension 01/01/1988    Hayfever     Seasonal per patient    History of prostatitis     Hyperlipemia 01/01/1988    atorvastatin    Localized primary osteoarthrosis of carpometacarpal joint of right wrist 07/15/2015    Premature atrial beats 08/02/2019        Social History     Tobacco Use    Smoking status: Never    Smokeless tobacco: Never   Substance Use Topics    Alcohol use: Yes     Alcohol/week: 5.0 standard drinks of alcohol     Types: 5 Cans of beer per week     Comment: usually has a couple of beers 2-3 x per week    Drug use: No        Current Outpatient Medications   Medication Sig Dispense Refill    atorvastatin (LIPITOR) 40 MG tablet TAKE 1 TABLET BY MOUTH EVERY DAY 90 tablet 2    chlorthalidone (HYGROTON) 25 MG tablet TAKE 1 TABLET BY MOUTH EVERY DAY 90 tablet 3    cholecalciferol, vitamin D3, 125 mcg (5,000 unit) TbDL [CHOLECALCIFEROL, VITAMIN D3, 125 MCG (5,000 UNIT) TBDL] Take 5,000 Units by mouth once a week. Takes on Mondays  Taking one a week during summer and then daily in the winter      levothyroxine (SYNTHROID/LEVOTHROID) 50 MCG tablet Take 1 tablet (50 mcg) by mouth daily. 90 tablet 3    potassium chloride ER (K-TAB/KLOR-CON) 10 MEQ CR tablet TAKE 1 TABLET BY MOUTH EVERY DAY 90 tablet 1    tadalafil (CIALIS) 10 MG tablet Take 1 tablet (10 mg) by mouth daily as needed (male erectile dysfunction). 30 tablet 1    terazosin (HYTRIN) 10 MG capsule Take 1 capsule (10 mg) by mouth at bedtime. 90 capsule 3    tetrahydrozoline-zinc (VISINE-AC) 0.05-0.25 % ophthalmic solution Place into both eyes 4 times daily as needed      triamcinolone (NASACORT) 55 mcg nasal inhaler Spray 1-2 sprays into both nostrils 2 times daily as needed            Objective:    Vitals:    05/28/25 0723   BP: 122/80   Pulse: 103   Resp: 17   Temp: 97.6  F (36.4  C)   SpO2: 95%   Weight: 86.2 kg (190 lb)   Height: 1.74 m (5' 8.5\")      Body mass index is 28.47 kg/m .    Alert.  No apparent distress.  Chest clear.  Cardiac " exam regular.  Extremities warm and dry.      This note has been dictated using voice recognition software and as a result may contain minor grammatical errors and unintended word substitutions.       Answers submitted by the patient for this visit:  General Questionnaire (Submitted on 5/24/2025)  Chief Complaint: Chronic problems general questions HPI Form  What is the reason for your visit today? : Routine checkup  How many days per week do you miss taking your medication?: 0  Questionnaire about: Chronic problems general questions HPI Form (Submitted on 5/24/2025)  Chief Complaint: Chronic problems general questions HPI Form

## 2025-05-29 LAB
ANION GAP SERPL CALCULATED.3IONS-SCNC: 12 MMOL/L (ref 7–15)
BUN SERPL-MCNC: 18.8 MG/DL (ref 8–23)
CALCIUM SERPL-MCNC: 9.5 MG/DL (ref 8.8–10.4)
CHLORIDE SERPL-SCNC: 103 MMOL/L (ref 98–107)
CHOLEST SERPL-MCNC: 135 MG/DL
CREAT SERPL-MCNC: 1.21 MG/DL (ref 0.67–1.17)
EGFRCR SERPLBLD CKD-EPI 2021: 62 ML/MIN/1.73M2
FASTING STATUS PATIENT QL REPORTED: YES
FASTING STATUS PATIENT QL REPORTED: YES
GLUCOSE SERPL-MCNC: 116 MG/DL (ref 70–99)
HCO3 SERPL-SCNC: 26 MMOL/L (ref 22–29)
HDLC SERPL-MCNC: 48 MG/DL
LDLC SERPL CALC-MCNC: 66 MG/DL
NONHDLC SERPL-MCNC: 87 MG/DL
POTASSIUM SERPL-SCNC: 3.3 MMOL/L (ref 3.4–5.3)
SODIUM SERPL-SCNC: 141 MMOL/L (ref 135–145)
TRIGL SERPL-MCNC: 104 MG/DL
TSH SERPL DL<=0.005 MIU/L-ACNC: 3.26 UIU/ML (ref 0.3–4.2)
VIT D+METAB SERPL-MCNC: 47 NG/ML (ref 20–50)

## 2025-06-07 DIAGNOSIS — E87.6 HYPOKALEMIA: ICD-10-CM

## 2025-06-10 RX ORDER — POTASSIUM CHLORIDE 750 MG/1
10 TABLET, EXTENDED RELEASE ORAL DAILY
Qty: 90 TABLET | Refills: 1 | Status: SHIPPED | OUTPATIENT
Start: 2025-06-10